# Patient Record
Sex: FEMALE | Race: OTHER | NOT HISPANIC OR LATINO | URBAN - METROPOLITAN AREA
[De-identification: names, ages, dates, MRNs, and addresses within clinical notes are randomized per-mention and may not be internally consistent; named-entity substitution may affect disease eponyms.]

---

## 2017-02-20 ENCOUNTER — EMERGENCY (EMERGENCY)
Facility: HOSPITAL | Age: 77
LOS: 1 days | Discharge: HOME | End: 2017-02-20
Attending: EMERGENCY MEDICINE | Admitting: HOSPITALIST
Payer: MEDICAID

## 2017-02-20 VITALS
SYSTOLIC BLOOD PRESSURE: 132 MMHG | WEIGHT: 166.89 LBS | RESPIRATION RATE: 18 BRPM | OXYGEN SATURATION: 92 % | HEART RATE: 132 BPM | TEMPERATURE: 102 F | DIASTOLIC BLOOD PRESSURE: 76 MMHG

## 2017-02-20 DIAGNOSIS — E78.5 HYPERLIPIDEMIA, UNSPECIFIED: ICD-10-CM

## 2017-02-20 DIAGNOSIS — I10 ESSENTIAL (PRIMARY) HYPERTENSION: ICD-10-CM

## 2017-02-20 DIAGNOSIS — J10.1 INFLUENZA DUE TO OTHER IDENTIFIED INFLUENZA VIRUS WITH OTHER RESPIRATORY MANIFESTATIONS: ICD-10-CM

## 2017-02-20 DIAGNOSIS — H04.559 ACQUIRED STENOSIS OF UNSPECIFIED NASOLACRIMAL DUCT: Chronic | ICD-10-CM

## 2017-02-20 DIAGNOSIS — I82.90 ACUTE EMBOLISM AND THROMBOSIS OF UNSPECIFIED VEIN: ICD-10-CM

## 2017-02-20 DIAGNOSIS — R94.5 ABNORMAL RESULTS OF LIVER FUNCTION STUDIES: ICD-10-CM

## 2017-02-20 DIAGNOSIS — A41.9 SEPSIS, UNSPECIFIED ORGANISM: ICD-10-CM

## 2017-02-20 DIAGNOSIS — E03.9 HYPOTHYROIDISM, UNSPECIFIED: ICD-10-CM

## 2017-02-20 LAB
ALBUMIN SERPL ELPH-MCNC: 4.5 G/DL — SIGNIFICANT CHANGE UP (ref 3.3–5)
ALP SERPL-CCNC: 59 U/L — SIGNIFICANT CHANGE UP (ref 40–120)
ALT FLD-CCNC: 28 U/L — SIGNIFICANT CHANGE UP (ref 4–33)
APPEARANCE UR: CLEAR — SIGNIFICANT CHANGE UP
AST SERPL-CCNC: 34 U/L — HIGH (ref 4–32)
B PERT DNA SPEC QL NAA+PROBE: SIGNIFICANT CHANGE UP
BACTERIA # UR AUTO: SIGNIFICANT CHANGE UP
BASE EXCESS BLDV CALC-SCNC: 0.9 MMOL/L — SIGNIFICANT CHANGE UP
BASOPHILS # BLD AUTO: 0.01 K/UL — SIGNIFICANT CHANGE UP (ref 0–0.2)
BASOPHILS NFR BLD AUTO: 0.1 % — SIGNIFICANT CHANGE UP (ref 0–2)
BASOPHILS NFR SPEC: 0 % — SIGNIFICANT CHANGE UP (ref 0–2)
BILIRUB SERPL-MCNC: 1.7 MG/DL — HIGH (ref 0.2–1.2)
BILIRUB UR-MCNC: NEGATIVE — SIGNIFICANT CHANGE UP
BLOOD GAS VENOUS - CREATININE: 1.27 MG/DL — SIGNIFICANT CHANGE UP (ref 0.5–1.3)
BLOOD UR QL VISUAL: HIGH
BUN SERPL-MCNC: 23 MG/DL — SIGNIFICANT CHANGE UP (ref 7–23)
C PNEUM DNA SPEC QL NAA+PROBE: NOT DETECTED — SIGNIFICANT CHANGE UP
CALCIUM SERPL-MCNC: 9.3 MG/DL — SIGNIFICANT CHANGE UP (ref 8.4–10.5)
CHLORIDE BLDV-SCNC: 103 MMOL/L — SIGNIFICANT CHANGE UP (ref 96–108)
CHLORIDE SERPL-SCNC: 101 MMOL/L — SIGNIFICANT CHANGE UP (ref 98–107)
CO2 SERPL-SCNC: 21 MMOL/L — LOW (ref 22–31)
COLOR SPEC: SIGNIFICANT CHANGE UP
CREAT SERPL-MCNC: 1.24 MG/DL — SIGNIFICANT CHANGE UP (ref 0.5–1.3)
EOSINOPHIL # BLD AUTO: 0.01 K/UL — SIGNIFICANT CHANGE UP (ref 0–0.5)
EOSINOPHIL NFR BLD AUTO: 0.1 % — SIGNIFICANT CHANGE UP (ref 0–6)
EOSINOPHIL NFR FLD: 0 % — SIGNIFICANT CHANGE UP (ref 0–6)
FLUAV H1 2009 PAND RNA SPEC QL NAA+PROBE: POSITIVE — HIGH
FLUAV H1 RNA SPEC QL NAA+PROBE: NOT DETECTED — SIGNIFICANT CHANGE UP
FLUAV H3 RNA SPEC QL NAA+PROBE: NOT DETECTED — SIGNIFICANT CHANGE UP
FLUBV RNA SPEC QL NAA+PROBE: NOT DETECTED — SIGNIFICANT CHANGE UP
GAS PNL BLDV: 140 MMOL/L — SIGNIFICANT CHANGE UP (ref 136–146)
GLUCOSE BLDV-MCNC: 122 — HIGH (ref 70–99)
GLUCOSE SERPL-MCNC: 120 MG/DL — HIGH (ref 70–99)
GLUCOSE UR-MCNC: NEGATIVE — SIGNIFICANT CHANGE UP
HADV DNA SPEC QL NAA+PROBE: NOT DETECTED — SIGNIFICANT CHANGE UP
HCO3 BLDV-SCNC: 25 MMOL/L — SIGNIFICANT CHANGE UP (ref 20–27)
HCOV 229E RNA SPEC QL NAA+PROBE: NOT DETECTED — SIGNIFICANT CHANGE UP
HCOV HKU1 RNA SPEC QL NAA+PROBE: NOT DETECTED — SIGNIFICANT CHANGE UP
HCOV NL63 RNA SPEC QL NAA+PROBE: NOT DETECTED — SIGNIFICANT CHANGE UP
HCOV OC43 RNA SPEC QL NAA+PROBE: NOT DETECTED — SIGNIFICANT CHANGE UP
HCT VFR BLD CALC: 38.3 % — SIGNIFICANT CHANGE UP (ref 34.5–45)
HCT VFR BLDV CALC: 40.4 % — SIGNIFICANT CHANGE UP (ref 34.5–45)
HGB BLD-MCNC: 12.9 G/DL — SIGNIFICANT CHANGE UP (ref 11.5–15.5)
HGB BLDV-MCNC: 13.1 G/DL — SIGNIFICANT CHANGE UP (ref 11.5–15.5)
HMPV RNA SPEC QL NAA+PROBE: NOT DETECTED — SIGNIFICANT CHANGE UP
HPIV1 RNA SPEC QL NAA+PROBE: NOT DETECTED — SIGNIFICANT CHANGE UP
HPIV2 RNA SPEC QL NAA+PROBE: NOT DETECTED — SIGNIFICANT CHANGE UP
HPIV3 RNA SPEC QL NAA+PROBE: NOT DETECTED — SIGNIFICANT CHANGE UP
HPIV4 RNA SPEC QL NAA+PROBE: NOT DETECTED — SIGNIFICANT CHANGE UP
IMM GRANULOCYTES NFR BLD AUTO: 0.4 % — SIGNIFICANT CHANGE UP (ref 0–1.5)
KETONES UR-MCNC: NEGATIVE — SIGNIFICANT CHANGE UP
LACTATE BLDV-MCNC: 1.4 MMOL/L — SIGNIFICANT CHANGE UP (ref 0.5–2)
LEUKOCYTE ESTERASE UR-ACNC: HIGH
LYMPHOCYTES # BLD AUTO: 0.31 K/UL — LOW (ref 1–3.3)
LYMPHOCYTES # BLD AUTO: 4.3 % — LOW (ref 13–44)
LYMPHOCYTES NFR SPEC AUTO: 3 % — LOW (ref 13–44)
M PNEUMO DNA SPEC QL NAA+PROBE: NOT DETECTED — SIGNIFICANT CHANGE UP
MANUAL SMEAR VERIFICATION: SIGNIFICANT CHANGE UP
MCHC RBC-ENTMCNC: 28.5 PG — SIGNIFICANT CHANGE UP (ref 27–34)
MCHC RBC-ENTMCNC: 33.7 % — SIGNIFICANT CHANGE UP (ref 32–36)
MCV RBC AUTO: 84.7 FL — SIGNIFICANT CHANGE UP (ref 80–100)
METAMYELOCYTES # FLD: 1 % — SIGNIFICANT CHANGE UP (ref 0–1)
MONOCYTES # BLD AUTO: 0.22 K/UL — SIGNIFICANT CHANGE UP (ref 0–0.9)
MONOCYTES NFR BLD AUTO: 3.1 % — SIGNIFICANT CHANGE UP (ref 2–14)
MONOCYTES NFR BLD: 3 % — SIGNIFICANT CHANGE UP (ref 2–9)
NEUTROPHIL AB SER-ACNC: 86 % — HIGH (ref 43–77)
NEUTROPHILS # BLD AUTO: 6.62 K/UL — SIGNIFICANT CHANGE UP (ref 1.8–7.4)
NEUTROPHILS NFR BLD AUTO: 92 % — HIGH (ref 43–77)
NEUTS BAND # BLD: 7 % — HIGH (ref 0–6)
NITRITE UR-MCNC: NEGATIVE — SIGNIFICANT CHANGE UP
NON-SQ EPI CELLS # UR AUTO: <1 — SIGNIFICANT CHANGE UP
PCO2 BLDV: 38 MMHG — LOW (ref 41–51)
PH BLDV: 7.43 PH — SIGNIFICANT CHANGE UP (ref 7.32–7.43)
PH UR: 7 — SIGNIFICANT CHANGE UP (ref 4.6–8)
PLATELET # BLD AUTO: 111 K/UL — LOW (ref 150–400)
PLATELET COUNT - ESTIMATE: SIGNIFICANT CHANGE UP
PMV BLD: 10.7 FL — SIGNIFICANT CHANGE UP (ref 7–13)
PO2 BLDV: 36 MMHG — SIGNIFICANT CHANGE UP (ref 35–40)
POTASSIUM BLDV-SCNC: 3.6 MMOL/L — SIGNIFICANT CHANGE UP (ref 3.4–4.5)
POTASSIUM SERPL-MCNC: 3.7 MMOL/L — SIGNIFICANT CHANGE UP (ref 3.5–5.3)
POTASSIUM SERPL-SCNC: 3.7 MMOL/L — SIGNIFICANT CHANGE UP (ref 3.5–5.3)
PROT SERPL-MCNC: 7.4 G/DL — SIGNIFICANT CHANGE UP (ref 6–8.3)
PROT UR-MCNC: 20 — SIGNIFICANT CHANGE UP
RBC # BLD: 4.52 M/UL — SIGNIFICANT CHANGE UP (ref 3.8–5.2)
RBC # FLD: 13.9 % — SIGNIFICANT CHANGE UP (ref 10.3–14.5)
RBC CASTS # UR COMP ASSIST: SIGNIFICANT CHANGE UP (ref 0–?)
RSV RNA SPEC QL NAA+PROBE: NOT DETECTED — SIGNIFICANT CHANGE UP
RV+EV RNA SPEC QL NAA+PROBE: NOT DETECTED — SIGNIFICANT CHANGE UP
SAO2 % BLDV: 67.7 % — SIGNIFICANT CHANGE UP (ref 60–85)
SODIUM SERPL-SCNC: 142 MMOL/L — SIGNIFICANT CHANGE UP (ref 135–145)
SP GR SPEC: 1.01 — SIGNIFICANT CHANGE UP (ref 1–1.03)
SQUAMOUS # UR AUTO: SIGNIFICANT CHANGE UP
UROBILINOGEN FLD QL: NORMAL E.U. — SIGNIFICANT CHANGE UP (ref 0.1–0.2)
WBC # BLD: 7.2 K/UL — SIGNIFICANT CHANGE UP (ref 3.8–10.5)
WBC # FLD AUTO: 7.2 K/UL — SIGNIFICANT CHANGE UP (ref 3.8–10.5)
WBC UR QL: HIGH (ref 0–?)

## 2017-02-20 PROCEDURE — 99285 EMERGENCY DEPT VISIT HI MDM: CPT

## 2017-02-20 RX ORDER — CEFTRIAXONE 500 MG/1
1 INJECTION, POWDER, FOR SOLUTION INTRAMUSCULAR; INTRAVENOUS ONCE
Qty: 0 | Refills: 0 | Status: COMPLETED | OUTPATIENT
Start: 2017-02-20 | End: 2017-02-20

## 2017-02-20 RX ORDER — HEPARIN SODIUM 5000 [USP'U]/ML
5000 INJECTION INTRAVENOUS; SUBCUTANEOUS EVERY 12 HOURS
Qty: 0 | Refills: 0 | Status: DISCONTINUED | OUTPATIENT
Start: 2017-02-20 | End: 2017-02-21

## 2017-02-20 RX ORDER — SIMVASTATIN 20 MG/1
10 TABLET, FILM COATED ORAL AT BEDTIME
Qty: 0 | Refills: 0 | Status: DISCONTINUED | OUTPATIENT
Start: 2017-02-20 | End: 2017-02-21

## 2017-02-20 RX ORDER — ONDANSETRON 8 MG/1
4 TABLET, FILM COATED ORAL EVERY 6 HOURS
Qty: 0 | Refills: 0 | Status: DISCONTINUED | OUTPATIENT
Start: 2017-02-20 | End: 2017-02-21

## 2017-02-20 RX ORDER — LEVOTHYROXINE SODIUM 125 MCG
25 TABLET ORAL DAILY
Qty: 0 | Refills: 0 | Status: DISCONTINUED | OUTPATIENT
Start: 2017-02-20 | End: 2017-02-21

## 2017-02-20 RX ORDER — AZITHROMYCIN 500 MG/1
500 TABLET, FILM COATED ORAL ONCE
Qty: 0 | Refills: 0 | Status: COMPLETED | OUTPATIENT
Start: 2017-02-20 | End: 2017-02-20

## 2017-02-20 RX ORDER — SODIUM CHLORIDE 9 MG/ML
1000 INJECTION INTRAMUSCULAR; INTRAVENOUS; SUBCUTANEOUS ONCE
Qty: 0 | Refills: 0 | Status: COMPLETED | OUTPATIENT
Start: 2017-02-20 | End: 2017-02-20

## 2017-02-20 RX ORDER — ACETAMINOPHEN 500 MG
1000 TABLET ORAL ONCE
Qty: 0 | Refills: 0 | Status: COMPLETED | OUTPATIENT
Start: 2017-02-20 | End: 2017-02-20

## 2017-02-20 RX ORDER — SODIUM CHLORIDE 9 MG/ML
1000 INJECTION INTRAMUSCULAR; INTRAVENOUS; SUBCUTANEOUS
Qty: 0 | Refills: 0 | Status: DISCONTINUED | OUTPATIENT
Start: 2017-02-20 | End: 2017-02-21

## 2017-02-20 RX ORDER — ACETAMINOPHEN 500 MG
650 TABLET ORAL EVERY 6 HOURS
Qty: 0 | Refills: 0 | Status: DISCONTINUED | OUTPATIENT
Start: 2017-02-20 | End: 2017-02-21

## 2017-02-20 RX ADMIN — Medication 30 MILLIGRAM(S): at 13:52

## 2017-02-20 RX ADMIN — Medication 400 MILLIGRAM(S): at 12:07

## 2017-02-20 RX ADMIN — Medication 650 MILLIGRAM(S): at 19:34

## 2017-02-20 RX ADMIN — HEPARIN SODIUM 5000 UNIT(S): 5000 INJECTION INTRAVENOUS; SUBCUTANEOUS at 19:20

## 2017-02-20 RX ADMIN — SIMVASTATIN 10 MILLIGRAM(S): 20 TABLET, FILM COATED ORAL at 21:32

## 2017-02-20 RX ADMIN — ONDANSETRON 4 MILLIGRAM(S): 8 TABLET, FILM COATED ORAL at 19:20

## 2017-02-20 RX ADMIN — SODIUM CHLORIDE 1000 MILLILITER(S): 9 INJECTION INTRAMUSCULAR; INTRAVENOUS; SUBCUTANEOUS at 12:07

## 2017-02-20 RX ADMIN — SODIUM CHLORIDE 75 MILLILITER(S): 9 INJECTION INTRAMUSCULAR; INTRAVENOUS; SUBCUTANEOUS at 19:22

## 2017-02-20 RX ADMIN — CEFTRIAXONE 100 GRAM(S): 500 INJECTION, POWDER, FOR SOLUTION INTRAMUSCULAR; INTRAVENOUS at 13:52

## 2017-02-20 RX ADMIN — AZITHROMYCIN 250 MILLIGRAM(S): 500 TABLET, FILM COATED ORAL at 14:42

## 2017-02-20 NOTE — H&P ADULT. - ATTENDING COMMENTS
Patient seen and examined, plan of care d/w Dr. Oakes.  76 yo F with HTN, HLD, hypothyroidism presenting from home with chills since yesterday ~7pm and lethargy and confusion in setting of high fevers this am brought in by family for this dx in ED w/ influenza A.   #Influenza A a/ mild hypoxia: c/w tamiflu x 5 day as sx <24H (30 BID, renal dosing GFR <60), hold further abx as no LA, although meeting SIRS w/ bandemia patient nontoxic appearing low suspicion for bacterial infection at this time, CXR appears clear on my read, can repeat PA&L in am if worsening resp status to further clarify; IVF as still w/ nausea and unable to tolerate PO H20  #Encephalopathy/delirium: likely in setting of high fevers, nonfocal exam, now at baseline per family, continue to monitor  #CKD 3: monitor Cr, renally dose meds  #HTN: hold home BP meds for now  # EKG: appears to be sinus, no st changes, trop neg x 1, no CP, can recheck now that HR slowed Patient seen and examined, plan of care d/w Dr. Oakes.  Offered  via [place]  but defers to family at bedside.  76 yo F with HTN, HLD, hypothyroidism presenting from home with chills since yesterday ~7pm and lethargy and confusion in setting of high fevers this am brought in by family for this dx in ED w/ influenza A.   #Influenza A a/ mild hypoxia: c/w tamiflu x 5 day as sx <24H (30 BID, renal dosing GFR <60), hold further abx as no LA, although meeting SIRS w/ bandemia patient nontoxic appearing low suspicion for bacterial infection at this time, CXR appears clear on my read, can repeat PA&L in am if worsening resp status to further clarify; IVF as still w/ nausea and unable to tolerate PO H20  #Encephalopathy/delirium: likely in setting of high fevers, nonfocal exam, now at baseline per family, continue to monitor  #CKD 3: monitor Cr, renally dose meds  #HTN: hold home BP meds for now  # EKG: appears to be sinus, no st changes, trop neg x 1, no CP, can recheck now that HR slowed

## 2017-02-20 NOTE — H&P ADULT. - EKG AND INTERPRETATION
Personally reviewed by me. EKG showed tachycardia, with p waves, but some variation of junctional rhythm. No ST changes.

## 2017-02-20 NOTE — ED PROVIDER NOTE - OBJECTIVE STATEMENT
77F PMH HTN, HLD, hypothyroid p/w fever, lethargy from home. Pt noted to be febrile to 102 yesterday with nonproductive cough, improved with Tylenol. This morning, pt was febrile to 104F, continued to cough and could not tolerate PO Tylenol (vomited). Pt previously at baseline, ambulatory without difficulty, alert and oriented, attending to all her own ADLs. +multiple sick contacts with flu/URI symptoms. No abdominal pain, dysuria, hematuria, diarrhea. Family also notes pt more lethargic and fatigued today compared to baseline.    Free translation services offered and refused, translation by May, family friend at bedside. 77F PMH HTN, HLD, hypothyroid p/w fever, lethargy from home. Pt noted to be febrile to 102 yesterday with nonproductive cough, improved with Tylenol. This morning, pt was febrile to 104F, continued to cough and could not tolerate PO Tylenol (vomited). Pt previously at baseline, ambulatory without difficulty, alert and oriented, attending to all her own ADLs. +multiple sick contacts with flu/URI symptoms. No abdominal pain, dysuria, hematuria, diarrhea. Family also notes pt more lethargic and fatigued today compared to baseline.    Free translation services offered and refused, translation by May, family friend at bedside.    Attendinyo female presents with cough and congestion since yesterday.  Also had fever and decreased level of consciousness.  Vomited x 1 today.

## 2017-02-20 NOTE — H&P ADULT. - PROBLEM SELECTOR PLAN 2
- Continuing tamiflu for 5 days.  - Will continue supportive measures with robitussin and tessalon perles.   - Droplet precautions. - Continuing tamiflu 75 mg BID for 5 days.  - Will continue supportive measures with robitussin and tessalon perles.   - Droplet precautions.

## 2017-02-20 NOTE — H&P ADULT. - HISTORY OF PRESENT ILLNESS
76 yo F with HLD/HTN, Hypothyroidism, presented with one day history of fevers and cough.  Patient was doing well up until last night 7 pm. She was at a barbeque and was dancing.  At night she started to feel unwell. Her niece checked her temperature it was 102. She gave the patient tylenol 1000mg and nyquil.  Patient slept well overnight. 8am the next day patient spiked another fever of 104. Patient was given more tylenol, but she was unable to tolerate PO intake at the moment She did not have nausea or vomiting, just felt she couldn't swallow the pills.  Patient had a pneumonia which was bacterial two years ago. That required a 4 day hospital stay.  Patient has multiple sick contacts at home.  No recent travel.      In the ED, pt was febrile to 102.7, came down to 98.7 after tylenol.  Tachycardic, with EKG showing junctional tachy to 130s. Saturating well while awake, but when she naps her sats go down to 92% on 3L NC.  Her RVP came back positive for influenza.  She received a dose of azithromycin and ceftriaxone, as well as oral 30mg tamiflu.

## 2017-02-20 NOTE — ED PROVIDER NOTE - PROGRESS NOTE DETAILS
Pt accepted for admission to hospitalist (Dr. Farmer). MAR textpage sent. pt and family aware and amenable to plan.

## 2017-02-20 NOTE — H&P ADULT. - PROBLEM SELECTOR PLAN 3
- Likely 2/2 to influenza. Will check again tomorrow morning for trend. Currently patient denies abdominal pain.

## 2017-02-20 NOTE — ED ADULT NURSE NOTE - OBJECTIVE STATEMENT
pt with a c/o lethargy and fever. pt with a pmhx of HTN HLD and Hypothyroidism . pt denies sob or chest pain. Iv placed and labs obtained.

## 2017-02-20 NOTE — H&P ADULT. - PROBLEM SELECTOR PLAN 1
Patient has tachycardia, fevers, and bandemia, meeting SIRS criteria, and source is respiratory viral infection 2/2 influenza, presenting as sepsis.   - Patient received antibiotics in the ED, but will not continue as this does not seem like a bacterial source, nor does it represent a bacterial superimposed infection.   - Will hold her Amlodipine for now, due to soft BPs  - Will give fluids as needed. Received 1L in the ER, so will hold off on further fluids for now.

## 2017-02-20 NOTE — H&P ADULT. - LAB RESULTS AND INTERPRETATION
Personally reviewed by me. Patient does not have leukocytosis, but does have bandemia of 7%, with slight elevation of bilirubin and AST.  Normal lactate. Positive for influenza A.

## 2017-02-20 NOTE — PATIENT PROFILE ADULT. - INTERPRETATION SERVICES DECLINED
Patient/Caregiver requests family/friend to interpret. Patient/Caregiver requests family/friend to interpret./called  service to make sure to family as ainterpreter  [id number]

## 2017-02-20 NOTE — ED PROVIDER NOTE - MEDICAL DECISION MAKING DETAILS
77F with sepsis, suspect viral source. Will obtain cbc, cmp, ua, urine cx, blood cx, ekg, cxr, rvp, given tylenol, IVF

## 2017-02-20 NOTE — ED ADULT NURSE NOTE - CHIEF COMPLAINT QUOTE
Pt presents with change in LOC. Pt lethargic, with fever of 104.3F this am taken by family. Pt has been coughing since 4pm yesterday with nausea/vomiting.  Hx HLD, Hypothyroidism, HTN. TP=[id number]

## 2017-02-21 ENCOUNTER — TRANSCRIPTION ENCOUNTER (OUTPATIENT)
Age: 77
End: 2017-02-21

## 2017-02-21 VITALS
HEART RATE: 88 BPM | SYSTOLIC BLOOD PRESSURE: 127 MMHG | TEMPERATURE: 98 F | RESPIRATION RATE: 16 BRPM | DIASTOLIC BLOOD PRESSURE: 72 MMHG | OXYGEN SATURATION: 96 %

## 2017-02-21 LAB
ALBUMIN SERPL ELPH-MCNC: 3.7 G/DL — SIGNIFICANT CHANGE UP (ref 3.3–5)
ALP SERPL-CCNC: 53 U/L — SIGNIFICANT CHANGE UP (ref 40–120)
ALT FLD-CCNC: 29 U/L — SIGNIFICANT CHANGE UP (ref 4–33)
AST SERPL-CCNC: 40 U/L — HIGH (ref 4–32)
BASOPHILS # BLD AUTO: 0.02 K/UL — SIGNIFICANT CHANGE UP (ref 0–0.2)
BASOPHILS NFR BLD AUTO: 0.3 % — SIGNIFICANT CHANGE UP (ref 0–2)
BILIRUB SERPL-MCNC: 1 MG/DL — SIGNIFICANT CHANGE UP (ref 0.2–1.2)
BUN SERPL-MCNC: 22 MG/DL — SIGNIFICANT CHANGE UP (ref 7–23)
CALCIUM SERPL-MCNC: 8.2 MG/DL — LOW (ref 8.4–10.5)
CHLORIDE SERPL-SCNC: 110 MMOL/L — HIGH (ref 98–107)
CO2 SERPL-SCNC: 20 MMOL/L — LOW (ref 22–31)
CREAT SERPL-MCNC: 0.9 MG/DL — SIGNIFICANT CHANGE UP (ref 0.5–1.3)
EOSINOPHIL # BLD AUTO: 0.15 K/UL — SIGNIFICANT CHANGE UP (ref 0–0.5)
EOSINOPHIL NFR BLD AUTO: 2.1 % — SIGNIFICANT CHANGE UP (ref 0–6)
GLUCOSE SERPL-MCNC: 102 MG/DL — HIGH (ref 70–99)
HCT VFR BLD CALC: 33.1 % — LOW (ref 34.5–45)
HGB BLD-MCNC: 10.8 G/DL — LOW (ref 11.5–15.5)
IMM GRANULOCYTES NFR BLD AUTO: 0.3 % — SIGNIFICANT CHANGE UP (ref 0–1.5)
LYMPHOCYTES # BLD AUTO: 0.68 K/UL — LOW (ref 1–3.3)
LYMPHOCYTES # BLD AUTO: 9.5 % — LOW (ref 13–44)
MAGNESIUM SERPL-MCNC: 2.1 MG/DL — SIGNIFICANT CHANGE UP (ref 1.6–2.6)
MCHC RBC-ENTMCNC: 28.1 PG — SIGNIFICANT CHANGE UP (ref 27–34)
MCHC RBC-ENTMCNC: 32.6 % — SIGNIFICANT CHANGE UP (ref 32–36)
MCV RBC AUTO: 86 FL — SIGNIFICANT CHANGE UP (ref 80–100)
MONOCYTES # BLD AUTO: 0.32 K/UL — SIGNIFICANT CHANGE UP (ref 0–0.9)
MONOCYTES NFR BLD AUTO: 4.5 % — SIGNIFICANT CHANGE UP (ref 2–14)
NEUTROPHILS # BLD AUTO: 5.97 K/UL — SIGNIFICANT CHANGE UP (ref 1.8–7.4)
NEUTROPHILS NFR BLD AUTO: 83.3 % — HIGH (ref 43–77)
PHOSPHATE SERPL-MCNC: 2.5 MG/DL — SIGNIFICANT CHANGE UP (ref 2.5–4.5)
PLATELET # BLD AUTO: 111 K/UL — LOW (ref 150–400)
PMV BLD: 11 FL — SIGNIFICANT CHANGE UP (ref 7–13)
POTASSIUM SERPL-MCNC: 4 MMOL/L — SIGNIFICANT CHANGE UP (ref 3.5–5.3)
POTASSIUM SERPL-SCNC: 4 MMOL/L — SIGNIFICANT CHANGE UP (ref 3.5–5.3)
PROT SERPL-MCNC: 5.8 G/DL — LOW (ref 6–8.3)
RBC # BLD: 3.85 M/UL — SIGNIFICANT CHANGE UP (ref 3.8–5.2)
RBC # FLD: 14.6 % — HIGH (ref 10.3–14.5)
SODIUM SERPL-SCNC: 144 MMOL/L — SIGNIFICANT CHANGE UP (ref 135–145)
SPECIMEN SOURCE: SIGNIFICANT CHANGE UP
SPECIMEN SOURCE: SIGNIFICANT CHANGE UP
WBC # BLD: 7.16 K/UL — SIGNIFICANT CHANGE UP (ref 3.8–10.5)
WBC # FLD AUTO: 7.16 K/UL — SIGNIFICANT CHANGE UP (ref 3.8–10.5)

## 2017-02-21 RX ORDER — LEVOTHYROXINE SODIUM 125 MCG
1 TABLET ORAL
Qty: 0 | Refills: 0 | COMMUNITY
Start: 2017-02-21

## 2017-02-21 RX ORDER — ACETAMINOPHEN 500 MG
2 TABLET ORAL
Qty: 30 | Refills: 0 | OUTPATIENT
Start: 2017-02-21

## 2017-02-21 RX ADMIN — Medication 30 MILLIGRAM(S): at 15:52

## 2017-02-21 RX ADMIN — Medication 30 MILLIGRAM(S): at 02:23

## 2017-02-21 RX ADMIN — Medication 650 MILLIGRAM(S): at 02:26

## 2017-02-21 RX ADMIN — HEPARIN SODIUM 5000 UNIT(S): 5000 INJECTION INTRAVENOUS; SUBCUTANEOUS at 07:13

## 2017-02-21 RX ADMIN — Medication 25 MICROGRAM(S): at 07:13

## 2017-02-21 NOTE — DISCHARGE NOTE ADULT - MEDICATION SUMMARY - MEDICATIONS TO TAKE
I will START or STAY ON the medications listed below when I get home from the hospital:    aspirin 81 mg oral tablet  -- 1 tab(s) by mouth once a day  -- Indication: For Health Maintenance    acetaminophen 325 mg oral tablet  -- 2 tab(s) by mouth every 6 hours, As needed, For Temp greater than 38 C (100.4 F)  -- Indication: For Fevers    simvastatin 10 mg oral tablet  -- 1 tab(s) by mouth once a day (at bedtime)  -- Indication: For HLD    oseltamivir 30 mg oral capsule  -- 1 cap(s) by mouth 2 times a day  -- Indication: For Influenza A with respiratory manifestations    alendronate 70 mg oral tablet  -- 1 tab(s) by mouth once a week on Tuesday  -- Indication: For Osteoporosis    amLODIPine 2.5 mg oral tablet  -- 1 tab(s) by mouth once a day  -- Indication: For Essential hypertension    Singulair 10 mg oral tablet  -- 1 tab(s) by mouth once a day  -- Indication: For Allergies    levothyroxine 25 mcg (0.025 mg) oral tablet  -- 1 tab(s) by mouth once a day  -- Indication: For Hypothyroidism, unspecified type    Calcium 500+D  -- 1 tab(s) by mouth 2 times a day  -- Indication: For Health

## 2017-02-21 NOTE — DISCHARGE NOTE ADULT - SECONDARY DIAGNOSIS.
Essential hypertension Hyperlipidemia, unspecified hyperlipidemia type Hypothyroidism, unspecified type

## 2017-02-21 NOTE — DISCHARGE NOTE ADULT - PATIENT PORTAL LINK FT
“You can access the [facility] Patient Portal, offered by [facility], by registering with the following website: [email or web address]”

## 2017-02-21 NOTE — DISCHARGE NOTE ADULT - HOSPITAL COURSE
76 yo F with HLD/HTN, Hypothyroidism, presented with one day history of fevers and cough.  Patient was doing well up until last night 7 pm. She was at a barbeque and was dancing.  At night she started to feel unwell. Her niece checked her temperature it was 102. She gave the patient tylenol 1000mg and nyquil.  Patient slept well overnight. 8am the next day patient spiked another fever of 104. Patient was given more tylenol, but she was unable to tolerate PO intake at the moment She did not have nausea or vomiting, just felt she couldn't swallow the pills.  Patient had a pneumonia which was bacterial two years ago. That required a 4 day hospital stay.  Patient has multiple sick contacts at home.  No recent travel.      In the ED, pt was febrile to 102.7, came down to 98.7 after tylenol.  Tachycardic, with EKG showing junctional tachy to 130s. Saturating well while awake, but when she naps her sats go down to 92% on 3L NC.  Her RVP came back positive for influenza.  She received a dose of azithromycin and ceftriaxone, as well as oral 30mg tamiflu.      76 yo F with hypothyroid, HTN/HLD, presented with 1 day of fevers and dry non productive cough. Found to be dehydrated, and positive for influenza A. Unable to tolerate PO intake. Was admitted for IV hydration, control of nausea and was started on Oseltamivir 30 mg BID (due to renal dosing).  Patient improved by the next day. No longer having high fevers. Patient tolerated PO intake. Patient was also ambulating on room air without any hypoxia.  Patient was safe for discharge home, with family at bedside.

## 2017-02-21 NOTE — DISCHARGE NOTE ADULT - PLAN OF CARE
Clearance of Viral Infection Please continue to take the medication called TAMIFLU, sent to your pharmacy for 4 more days.  You will take one medication two times a day.  A total of 8 pills will be filled for you at your pharmacy.  If you received a morning dose today, you will be due for a night time dose tonight (the day of discharge).    Continue to use nyquil, and tylenol for symptomatic relief.  DO NOT take more than 4,000 mg of Tylenol in one day. Goal BP to be <130/90 Continue to take your blood pressure medications at home as prescribed by your doctor. Controlling Lipids Continue your simvastatin as prescribed by your doctor.

## 2017-02-25 LAB
BACTERIA BLD CULT: SIGNIFICANT CHANGE UP
BACTERIA BLD CULT: SIGNIFICANT CHANGE UP

## 2017-03-12 ENCOUNTER — INPATIENT (INPATIENT)
Facility: HOSPITAL | Age: 77
LOS: 4 days | Discharge: HOME | End: 2017-03-17
Attending: HOSPITALIST | Admitting: HOSPITALIST
Payer: MEDICAID

## 2017-03-12 VITALS
SYSTOLIC BLOOD PRESSURE: 160 MMHG | HEART RATE: 120 BPM | RESPIRATION RATE: 18 BRPM | OXYGEN SATURATION: 98 % | DIASTOLIC BLOOD PRESSURE: 75 MMHG | TEMPERATURE: 98 F

## 2017-03-12 DIAGNOSIS — E78.5 HYPERLIPIDEMIA, UNSPECIFIED: ICD-10-CM

## 2017-03-12 DIAGNOSIS — I10 ESSENTIAL (PRIMARY) HYPERTENSION: ICD-10-CM

## 2017-03-12 DIAGNOSIS — R94.31 ABNORMAL ELECTROCARDIOGRAM [ECG] [EKG]: ICD-10-CM

## 2017-03-12 DIAGNOSIS — Z29.9 ENCOUNTER FOR PROPHYLACTIC MEASURES, UNSPECIFIED: ICD-10-CM

## 2017-03-12 DIAGNOSIS — N17.9 ACUTE KIDNEY FAILURE, UNSPECIFIED: ICD-10-CM

## 2017-03-12 DIAGNOSIS — H04.559 ACQUIRED STENOSIS OF UNSPECIFIED NASOLACRIMAL DUCT: Chronic | ICD-10-CM

## 2017-03-12 DIAGNOSIS — A41.9 SEPSIS, UNSPECIFIED ORGANISM: ICD-10-CM

## 2017-03-12 DIAGNOSIS — D72.825 BANDEMIA: ICD-10-CM

## 2017-03-12 DIAGNOSIS — E03.9 HYPOTHYROIDISM, UNSPECIFIED: ICD-10-CM

## 2017-03-12 LAB
ALBUMIN SERPL ELPH-MCNC: 4.5 G/DL — SIGNIFICANT CHANGE UP (ref 3.3–5)
ALP SERPL-CCNC: 60 U/L — SIGNIFICANT CHANGE UP (ref 40–120)
ALT FLD-CCNC: 24 U/L — SIGNIFICANT CHANGE UP (ref 4–33)
APPEARANCE UR: CLEAR — SIGNIFICANT CHANGE UP
AST SERPL-CCNC: 31 U/L — SIGNIFICANT CHANGE UP (ref 4–32)
B PERT DNA SPEC QL NAA+PROBE: SIGNIFICANT CHANGE UP
BACTERIA # UR AUTO: SIGNIFICANT CHANGE UP
BASE EXCESS BLDV CALC-SCNC: 3.3 MMOL/L — SIGNIFICANT CHANGE UP
BASOPHILS # BLD AUTO: 0 K/UL — SIGNIFICANT CHANGE UP (ref 0–0.2)
BASOPHILS NFR BLD AUTO: 0 % — SIGNIFICANT CHANGE UP (ref 0–2)
BASOPHILS NFR SPEC: 0 % — SIGNIFICANT CHANGE UP (ref 0–2)
BILIRUB SERPL-MCNC: 1.6 MG/DL — HIGH (ref 0.2–1.2)
BILIRUB UR-MCNC: NEGATIVE — SIGNIFICANT CHANGE UP
BLOOD GAS VENOUS - CREATININE: 1.28 MG/DL — SIGNIFICANT CHANGE UP (ref 0.5–1.3)
BLOOD UR QL VISUAL: HIGH
BUN SERPL-MCNC: 21 MG/DL — SIGNIFICANT CHANGE UP (ref 7–23)
C PNEUM DNA SPEC QL NAA+PROBE: NOT DETECTED — SIGNIFICANT CHANGE UP
CALCIUM SERPL-MCNC: 9.1 MG/DL — SIGNIFICANT CHANGE UP (ref 8.4–10.5)
CHLORIDE BLDV-SCNC: 104 MMOL/L — SIGNIFICANT CHANGE UP (ref 96–108)
CHLORIDE SERPL-SCNC: 101 MMOL/L — SIGNIFICANT CHANGE UP (ref 98–107)
CO2 SERPL-SCNC: 23 MMOL/L — SIGNIFICANT CHANGE UP (ref 22–31)
COLOR SPEC: YELLOW — SIGNIFICANT CHANGE UP
CREAT SERPL-MCNC: 1.31 MG/DL — HIGH (ref 0.5–1.3)
EOSINOPHIL # BLD AUTO: 0.02 K/UL — SIGNIFICANT CHANGE UP (ref 0–0.5)
EOSINOPHIL NFR BLD AUTO: 0.3 % — SIGNIFICANT CHANGE UP (ref 0–6)
EOSINOPHIL NFR FLD: 0 % — SIGNIFICANT CHANGE UP (ref 0–6)
FLUAV H1 2009 PAND RNA SPEC QL NAA+PROBE: NOT DETECTED — SIGNIFICANT CHANGE UP
FLUAV H1 RNA SPEC QL NAA+PROBE: NOT DETECTED — SIGNIFICANT CHANGE UP
FLUAV H3 RNA SPEC QL NAA+PROBE: NOT DETECTED — SIGNIFICANT CHANGE UP
FLUAV SUBTYP SPEC NAA+PROBE: SIGNIFICANT CHANGE UP
FLUBV RNA SPEC QL NAA+PROBE: NOT DETECTED — SIGNIFICANT CHANGE UP
GAS PNL BLDV: 135 MMOL/L — LOW (ref 136–146)
GLUCOSE BLDV-MCNC: 151 — HIGH (ref 70–99)
GLUCOSE SERPL-MCNC: 150 MG/DL — HIGH (ref 70–99)
GLUCOSE UR-MCNC: NEGATIVE — SIGNIFICANT CHANGE UP
HADV DNA SPEC QL NAA+PROBE: NOT DETECTED — SIGNIFICANT CHANGE UP
HCO3 BLDV-SCNC: 26 MMOL/L — SIGNIFICANT CHANGE UP (ref 20–27)
HCOV 229E RNA SPEC QL NAA+PROBE: NOT DETECTED — SIGNIFICANT CHANGE UP
HCOV HKU1 RNA SPEC QL NAA+PROBE: NOT DETECTED — SIGNIFICANT CHANGE UP
HCOV NL63 RNA SPEC QL NAA+PROBE: NOT DETECTED — SIGNIFICANT CHANGE UP
HCOV OC43 RNA SPEC QL NAA+PROBE: NOT DETECTED — SIGNIFICANT CHANGE UP
HCT VFR BLD CALC: 38.7 % — SIGNIFICANT CHANGE UP (ref 34.5–45)
HCT VFR BLDV CALC: 40.6 % — SIGNIFICANT CHANGE UP (ref 34.5–45)
HGB BLD-MCNC: 12.9 G/DL — SIGNIFICANT CHANGE UP (ref 11.5–15.5)
HGB BLDV-MCNC: 13.2 G/DL — SIGNIFICANT CHANGE UP (ref 11.5–15.5)
HMPV RNA SPEC QL NAA+PROBE: NOT DETECTED — SIGNIFICANT CHANGE UP
HPIV1 RNA SPEC QL NAA+PROBE: NOT DETECTED — SIGNIFICANT CHANGE UP
HPIV2 RNA SPEC QL NAA+PROBE: NOT DETECTED — SIGNIFICANT CHANGE UP
HPIV3 RNA SPEC QL NAA+PROBE: NOT DETECTED — SIGNIFICANT CHANGE UP
HPIV4 RNA SPEC QL NAA+PROBE: NOT DETECTED — SIGNIFICANT CHANGE UP
HYALINE CASTS # UR AUTO: SIGNIFICANT CHANGE UP (ref 0–?)
IMM GRANULOCYTES NFR BLD AUTO: 0.3 % — SIGNIFICANT CHANGE UP (ref 0–1.5)
KETONES UR-MCNC: NEGATIVE — SIGNIFICANT CHANGE UP
LACTATE BLDV-MCNC: 1.7 MMOL/L — SIGNIFICANT CHANGE UP (ref 0.5–2)
LEUKOCYTE ESTERASE UR-ACNC: NEGATIVE — SIGNIFICANT CHANGE UP
LG PLATELETS BLD QL AUTO: SLIGHT — SIGNIFICANT CHANGE UP
LYMPHOCYTES # BLD AUTO: 0.29 K/UL — LOW (ref 1–3.3)
LYMPHOCYTES # BLD AUTO: 3.9 % — LOW (ref 13–44)
LYMPHOCYTES NFR SPEC AUTO: 5 % — LOW (ref 13–44)
M PNEUMO DNA SPEC QL NAA+PROBE: NOT DETECTED — SIGNIFICANT CHANGE UP
MANUAL SMEAR VERIFICATION: SIGNIFICANT CHANGE UP
MCHC RBC-ENTMCNC: 28.2 PG — SIGNIFICANT CHANGE UP (ref 27–34)
MCHC RBC-ENTMCNC: 33.3 % — SIGNIFICANT CHANGE UP (ref 32–36)
MCV RBC AUTO: 84.7 FL — SIGNIFICANT CHANGE UP (ref 80–100)
MONOCYTES # BLD AUTO: 0.1 K/UL — SIGNIFICANT CHANGE UP (ref 0–0.9)
MONOCYTES NFR BLD AUTO: 1.3 % — LOW (ref 2–14)
MONOCYTES NFR BLD: 1 % — LOW (ref 2–9)
MORPHOLOGY BLD-IMP: NORMAL — SIGNIFICANT CHANGE UP
MUCOUS THREADS # UR AUTO: SIGNIFICANT CHANGE UP
NEUTROPHIL AB SER-ACNC: 84 % — HIGH (ref 43–77)
NEUTROPHILS # BLD AUTO: 7.03 K/UL — SIGNIFICANT CHANGE UP (ref 1.8–7.4)
NEUTROPHILS NFR BLD AUTO: 94.2 % — HIGH (ref 43–77)
NEUTS BAND # BLD: 10 % — HIGH (ref 0–6)
NITRITE UR-MCNC: NEGATIVE — SIGNIFICANT CHANGE UP
NON-SQ EPI CELLS # UR AUTO: <1 — SIGNIFICANT CHANGE UP
OVALOCYTES BLD QL SMEAR: SLIGHT — SIGNIFICANT CHANGE UP
PCO2 BLDV: 40 MMHG — LOW (ref 41–51)
PH BLDV: 7.45 PH — HIGH (ref 7.32–7.43)
PH UR: 8 — SIGNIFICANT CHANGE UP (ref 4.6–8)
PLATELET # BLD AUTO: 169 K/UL — SIGNIFICANT CHANGE UP (ref 150–400)
PMV BLD: 10.2 FL — SIGNIFICANT CHANGE UP (ref 7–13)
PO2 BLDV: 28 MMHG — LOW (ref 35–40)
POTASSIUM BLDV-SCNC: 3.6 MMOL/L — SIGNIFICANT CHANGE UP (ref 3.4–4.5)
POTASSIUM SERPL-MCNC: 3.9 MMOL/L — SIGNIFICANT CHANGE UP (ref 3.5–5.3)
POTASSIUM SERPL-SCNC: 3.9 MMOL/L — SIGNIFICANT CHANGE UP (ref 3.5–5.3)
PROT SERPL-MCNC: 8.1 G/DL — SIGNIFICANT CHANGE UP (ref 6–8.3)
PROT UR-MCNC: 100 — HIGH
RBC # BLD: 4.57 M/UL — SIGNIFICANT CHANGE UP (ref 3.8–5.2)
RBC # FLD: 14.4 % — SIGNIFICANT CHANGE UP (ref 10.3–14.5)
RBC CASTS # UR COMP ASSIST: SIGNIFICANT CHANGE UP (ref 0–?)
RSV RNA SPEC QL NAA+PROBE: NOT DETECTED — SIGNIFICANT CHANGE UP
RV+EV RNA SPEC QL NAA+PROBE: NOT DETECTED — SIGNIFICANT CHANGE UP
SAO2 % BLDV: 51.7 % — LOW (ref 60–85)
SODIUM SERPL-SCNC: 143 MMOL/L — SIGNIFICANT CHANGE UP (ref 135–145)
SP GR SPEC: 1 — SIGNIFICANT CHANGE UP (ref 1–1.03)
SQUAMOUS # UR AUTO: SIGNIFICANT CHANGE UP
UROBILINOGEN FLD QL: NORMAL E.U. — SIGNIFICANT CHANGE UP (ref 0.1–0.2)
WBC # BLD: 7.46 K/UL — SIGNIFICANT CHANGE UP (ref 3.8–10.5)
WBC # FLD AUTO: 7.46 K/UL — SIGNIFICANT CHANGE UP (ref 3.8–10.5)
WBC UR QL: SIGNIFICANT CHANGE UP (ref 0–?)

## 2017-03-12 PROCEDURE — 71250 CT THORAX DX C-: CPT | Mod: 26

## 2017-03-12 PROCEDURE — 71020: CPT | Mod: 26

## 2017-03-12 RX ORDER — LEVOTHYROXINE SODIUM 125 MCG
25 TABLET ORAL DAILY
Qty: 0 | Refills: 0 | Status: DISCONTINUED | OUTPATIENT
Start: 2017-03-12 | End: 2017-03-13

## 2017-03-12 RX ORDER — ACETAMINOPHEN 500 MG
1000 TABLET ORAL ONCE
Qty: 0 | Refills: 0 | Status: COMPLETED | OUTPATIENT
Start: 2017-03-12 | End: 2017-03-12

## 2017-03-12 RX ORDER — HEPARIN SODIUM 5000 [USP'U]/ML
5000 INJECTION INTRAVENOUS; SUBCUTANEOUS EVERY 8 HOURS
Qty: 0 | Refills: 0 | Status: DISCONTINUED | OUTPATIENT
Start: 2017-03-12 | End: 2017-03-17

## 2017-03-12 RX ORDER — ASPIRIN/CALCIUM CARB/MAGNESIUM 324 MG
81 TABLET ORAL DAILY
Qty: 0 | Refills: 0 | Status: DISCONTINUED | OUTPATIENT
Start: 2017-03-12 | End: 2017-03-17

## 2017-03-12 RX ORDER — ACETAMINOPHEN 500 MG
650 TABLET ORAL EVERY 6 HOURS
Qty: 0 | Refills: 0 | Status: DISCONTINUED | OUTPATIENT
Start: 2017-03-12 | End: 2017-03-17

## 2017-03-12 RX ORDER — SODIUM CHLORIDE 9 MG/ML
1000 INJECTION INTRAMUSCULAR; INTRAVENOUS; SUBCUTANEOUS
Qty: 0 | Refills: 0 | Status: DISCONTINUED | OUTPATIENT
Start: 2017-03-12 | End: 2017-03-13

## 2017-03-12 RX ORDER — SIMVASTATIN 20 MG/1
10 TABLET, FILM COATED ORAL AT BEDTIME
Qty: 0 | Refills: 0 | Status: DISCONTINUED | OUTPATIENT
Start: 2017-03-12 | End: 2017-03-17

## 2017-03-12 RX ORDER — SODIUM CHLORIDE 9 MG/ML
2000 INJECTION INTRAMUSCULAR; INTRAVENOUS; SUBCUTANEOUS ONCE
Qty: 0 | Refills: 0 | Status: COMPLETED | OUTPATIENT
Start: 2017-03-12 | End: 2017-03-12

## 2017-03-12 RX ORDER — ONDANSETRON 8 MG/1
4 TABLET, FILM COATED ORAL EVERY 6 HOURS
Qty: 0 | Refills: 0 | Status: DISCONTINUED | OUTPATIENT
Start: 2017-03-12 | End: 2017-03-12

## 2017-03-12 RX ORDER — METOCLOPRAMIDE HCL 10 MG
10 TABLET ORAL ONCE
Qty: 0 | Refills: 0 | Status: COMPLETED | OUTPATIENT
Start: 2017-03-12 | End: 2017-03-12

## 2017-03-12 RX ORDER — DIPHENHYDRAMINE HCL 50 MG
25 CAPSULE ORAL ONCE
Qty: 0 | Refills: 0 | Status: COMPLETED | OUTPATIENT
Start: 2017-03-12 | End: 2017-03-12

## 2017-03-12 RX ADMIN — Medication 400 MILLIGRAM(S): at 17:39

## 2017-03-12 RX ADMIN — SODIUM CHLORIDE 75 MILLILITER(S): 9 INJECTION INTRAMUSCULAR; INTRAVENOUS; SUBCUTANEOUS at 23:49

## 2017-03-12 RX ADMIN — SODIUM CHLORIDE 2000 MILLILITER(S): 9 INJECTION INTRAMUSCULAR; INTRAVENOUS; SUBCUTANEOUS at 17:05

## 2017-03-12 RX ADMIN — Medication 25 MILLIGRAM(S): at 22:10

## 2017-03-12 RX ADMIN — ONDANSETRON 4 MILLIGRAM(S): 8 TABLET, FILM COATED ORAL at 22:10

## 2017-03-12 RX ADMIN — Medication 10 MILLIGRAM(S): at 17:39

## 2017-03-12 NOTE — H&P ADULT. - LAB RESULTS AND INTERPRETATION
Labs: CBC with 10% bands. BMP with elevated creatinine. UA with moderate blood (25 rbc) with no infection. Labs: CBC with 10% bands. BMP with elevated creatinine. UA with moderate blood (25 rbc) with no infection. RVP negative. Labs: CBC with stable Hgb, 10% bands. BMP with elevated creatinine. UA with moderate blood (25 rbc) with no infection. RVP negative. Labs: CBC with stable Hgb, 10% bands. BMP with elevated creatinine. UA with moderate blood (25 rbc), 10-25 wbc. RVP negative.

## 2017-03-12 NOTE — H&P ADULT. - PROBLEM SELECTOR PLAN 4
-Will hold amlodipine given pt hypovolemic and reassess in AM Pt with rash post levaquin, improved with benadryl  -levaquin added to list of allergies  -will avoid in the future.

## 2017-03-12 NOTE — H&P ADULT. - HISTORY OF PRESENT ILLNESS
76yo F with a hx of HTN, HLD and hypothyroidism, recent admission for influenza presents to ED with fever (Tmax of 102) and vomiting.         . Pt's grand-daughter states that she was better when she went home from the hospital and then became febrile again yesterday, with a tmax of 102. Today pt was unable to take any tylenol because she had multiple episodes of vomiting whenever she tried to take medications. No recent travel. No abd pain/diarrhea. No CP/SOB. +mild dry cough today. No dysuria. 78yo F with a hx of HTN, HLD and hypothyroidism, recent admission for influenza presents to ED with fever (Tmax of 102) and vomiting. Last night, patient began to have body aches. Around 3am, she felt nauseous and vomited. She has vomited (nbnb) 4 times in the day and has been unable to tolerate anything po. Granddaughter gave her odansetron 4mg which minimally helped. Granddaughter states pt was doing well after her discharge until last night. Pt also reports chills earlier today. Denies chest pain, sob, +chronic cough (dry) since last admission for the flu, denied abdominal pain, diarrhea, constipation, dysuria, melena. Nephew had a runny nose two days ago. No travel history.     ED: levaquin in ED  99.5    103    114/67   18    96%2lo2 76yo F with a hx of HTN, HLD and hypothyroidism, recent admission for influenza presents to ED with fever (Tmax of 102) and vomiting. Last night, patient began to have body aches. Around 3am, she felt nauseous and vomited. She has vomited (nbnb) 4 times in the day and has been unable to tolerate anything po. Granddaughter gave her odansetron 4mg which minimally helped. Granddaughter states pt was doing well after her discharge until last night. Pt also reports chills earlier today. Denies chest pain, sob, +chronic cough (dry) since last admission for the flu, denied abdominal pain, diarrhea, constipation, dysuria, melena. Nephew had a runny nose two days ago. No travel history.     ED: levaquin in ED and 2L NS. Pt and granddaughter noticed a rash on neck (no itchiness, no swelling, no hives)-unclear if old or after levaquin. Given benadryl 25mg IV x 1.   99.5    103    114/67   18    96%2lo2 Pt denied , requesting family to translate.    76yo Bengali speaking F with a hx of HTN, HLD and hypothyroidism, recent admission for influenza presents to ED with fever (Tmax of 102) and vomiting. Last night, patient began to have body aches. Around 3am, she felt nauseous and vomited. She has vomited (nbnb) 4 times in the day and has been unable to tolerate anything po. Granddaughter gave her odansetron 4mg which minimally helped. Granddaughter states pt was doing well after her discharge until last night. Pt also reports chills earlier today. Denies chest pain, sob, +chronic cough (dry) since last admission for the flu, denied URi symptoms abdominal pain, diarrhea, constipation, dysuria, melena. Nephew had a runny nose two days ago. No travel history.     ED: levaquin in ED and 2L NS. Pt and granddaughter noticed a rash on neck (no itchiness, no swelling, no hives)-unclear if old or after levaquin. Given benadryl 25mg IV x 1. ALso had 1 episode of watery diarrhea in ED.   99.5    103    114/67   18    96%2lo2 Pt denied , requesting family to translate.    76yo Pashto speaking F with a hx of HTN, HLD and hypothyroidism, recent admission for influenza presents to ED with fever (Tmax of 102) and vomiting. Last night, patient began to have body aches. Around 3am, she felt nauseous and vomited. She has vomited (nbnb) 4 times in the day and has been unable to tolerate anything po. Granddaughter gave her odansetron 4mg which minimally helped. Granddaughter states pt was doing well after her discharge until last night. Pt also reports chills earlier today. Denies chest pain, sob, +chronic cough (dry) since last admission for the flu, denied URi symptoms abdominal pain, diarrhea, constipation, dysuria, melena. Nephew had a runny nose two days ago, no one else vomiting/nauseous in family. No travel history.     ED: levaquin in ED and 2L NS. Pt and granddaughter noticed a rash on neck (no itchiness, no swelling, no hives)-unclear if old or after levaquin. Given benadryl 25mg IV x 1. ALso had 1 episode of watery diarrhea in ED.   99.5    103    114/67   18    96%2lo2 Pt denied , requesting family to translate.    78yo Ukrainian speaking F with a hx of HTN, HLD and hypothyroidism, recent admission for influenza presents to ED with fever (Tmax of 102) and vomiting. Last night, patient began to have body aches. Around 3am, she felt nauseous and vomited. She has vomited (nbnb) 4 times in the day and has been unable to tolerate anything po. Granddaughter gave her odansetron 4mg which minimally helped. Granddaughter states pt was doing well after her discharge until last night. Pt also reports chills earlier today. Denies chest pain, sob, +chronic cough (dry) since last admission for the flu, denied URi symptoms abdominal pain, diarrhea, constipation, dysuria, melena. Nephew had a runny nose two days ago, no one else vomiting/nauseous in family. No travel history.     ED: levaquin in ED and 2L NS. Pt and granddaughter noticed a rash on neck (no itchiness, no swelling, no hives)-unclear if old or after levaquin. Given benadryl 25mg IV x 1. ALso had 1 episode of watery diarrhea in ED. After, per granddaughter, pt felt acutely weak BP 90/60-diaphoretic and pale. received 1 NS bolus.     99.5    103    114/67   18    96%2lo2 Pt denied , requesting family to translate.    78yo Vietnamese speaking F with a hx of HTN, HLD and hypothyroidism, recent admission for influenza presents to ED with fever (Tmax of 102) and vomiting. Last night, patient began to have body aches. Around 3am, she felt nauseous and vomited. She has vomited (nbnb) 4 times in the day and has been unable to tolerate anything po. Granddaughter gave her odansetron 4mg which minimally helped. Granddaughter states pt was doing well after her discharge until last night. Pt also reports chills earlier today. Denies chest pain, sob, +chronic cough (dry) since last admission for the flu, denied URi symptoms abdominal pain, diarrhea, constipation, dysuria, melena. Nephew had a runny nose two days ago, no one else vomiting/nauseous in family. No travel history.     ED: levaquin in ED and 2L NS. Pt and granddaughter noticed a rash on neck (no itchiness, no swelling, no hives)-unclear if old or after levaquin. Given benadryl 25mg IV x 1. ALso had 1 episode of watery diarrhea in ED. After, per granddaughter, pt felt acutely weak BP 90/60-diaphoretic and pale. received 1 NS bolus. Tmax 99.5   HR 90-100s, 96% 2L 02. Pt denied , requesting family to translate.    78yo Tamazight speaking F with a hx of HTN, HLD and hypothyroidism, recent admission for influenza presents to ED with fever (Tmax of 102) and vomiting. Last night, patient began to have body aches. Around 3am, she felt nauseous and vomited. She has vomited (nbnb) 4 times in the day and has been unable to tolerate anything po. Granddaughter gave her odansetron 4mg which minimally helped. Granddaughter states pt was doing well after her discharge until last night. Pt also reports chills earlier today. Denies chest pain, sob, +chronic cough (dry) since last admission for the flu, denied URi symptoms abdominal pain, diarrhea, constipation, dysuria, melena. Nephew had a runny nose two days ago, no one else vomiting/nauseous in family. No travel history.     ED: levaquin in ED and 2L NS. Pt and granddaughter noticed a rash on neck (no itchiness, no swelling, no hives)-unclear if old or after levaquin. Given benadryl 25mg IV x 1. ALso had 1 episode of watery diarrhea in ED. After, pt felt acutely weak BP 90/60-diaphoretic and pale. received 1 NS bolus. .   Tmax 99.5   HR 90-100s, 96% 2L 02. Pt denied , requesting family to translate.    76yo Irish speaking F with a hx of HTN, HLD and hypothyroidism, recent admission for influenza presents to ED with fever (Tmax of 102) and vomiting. Last night, patient began to have body aches. Around 3am, she felt nauseous and vomited. She has vomited (nbnb) 4 times in the day and has been unable to tolerate anything po. Granddaughter gave her odansetron 4mg which minimally helped. Granddaughter states pt was doing well after her discharge until last night. Pt also reports chills earlier today. Denies chest pain, sob, +chronic cough (dry) since last admission for the flu, denied URi symptoms abdominal pain, diarrhea, constipation, dysuria, melena. Nephew had a runny nose two days ago, no one else vomiting/nauseous in family. No travel history.     ED: levaquin in ED and 2L NS. Pt and granddaughter noticed a rash on neck (no itchiness, no swelling, no hives)-unclear if old or after levaquin. Given benadryl 25mg IV x 1. ALso had 1 episode of watery diarrhea in ED. After, pt felt acutely weak BP 90/60-diaphoretic and pale. received 1 NS bolus. .   Tmax 99.5   HR 90-100s, 96% 2L 02.

## 2017-03-12 NOTE — H&P ADULT. - PROBLEM SELECTOR PLAN 6
-continue with simvastatin -check TSH (in 2014 was  -continue with synthroid -check TSH (in 2014 was 18.29)  -continue with synthroid 25 mcg daily

## 2017-03-12 NOTE — H&P ADULT. - PROBLEM SELECTOR PLAN 7
DVT ppx: hsq -continue with simvastatin -continue with simvastatin  -consider lipid panel since none noted in system (PMD may have recent values)

## 2017-03-12 NOTE — H&P ADULT. - PROBLEM SELECTOR PLAN 1
Pt with tachycardia and bands. Cxray and CT chest clear for pneumonia. UA negative for infection. Source may be viral given +sick contact (nephew) vs. abdominal source (vomiting, diarrhea, tenderness diffusely on palpation)  -f/up blood cultures  -cw maintenance fluids (pt still tachycardic after bolus) NS @ 75 cc/hr  -consider CT abdomen/pelvis to evaluate for abdominal souce of infection. Pt with tachycardia and bands. Cxray and CT chest clear for pneumonia. UA negative for infection. Source may abdominal source (vomiting, diarrhea, tenderness diffusely on palpation)  -f/up blood cultures  -cw maintenance fluids (pt still tachycardic after bolus) NS @ 75 cc/hr  -consider CT abdomen/pelvis to evaluate for abdominal souce of infection. Pt with tachycardia and bands. Cxray and CT chest clear for pneumonia. Source may abdominal source (vomiting, diarrhea, tenderness diffusely on palpation)  -f/up blood cultures  -cw maintenance fluids (pt still tachycardic after bolus) NS @ 75 cc/hr  -consider CT abdomen/pelvis to evaluate for abdominal souce of infection. Pt with tachycardia and bands. CT chest showing ground glass opacities. Source may be pulmonary (pt may have had a superimposed pna after influenca)-will cover for hcap.   -start vancomycin/ zosyn.   -f/up blood cultures  -cw maintenance fluids (pt still tachycardic after bolus) NS @ 75 cc/hr x 24 hours and then reassess   -consider CT abdomen/pelvis to evaluate for abdominal souce of infection given pt has (vomiting, diarrhea, mild tenderness diffusely on palpation). Will give simethicone and PPI for abdominal source. Pt with tachycardia and bands. CT chest showing ground glass opacities. Source may be pulmonary (pt may have had a superimposed pna with/after influenza)-will cover for hcap.  -start vancomycin/ zosyn.   -f/up blood cultures  -cw maintenance fluids (pt still tachycardic after bolus) NS @ 75 cc/hr x 24 hours and then reassess   -consider CT abdomen/pelvis to evaluate for abdominal source of infection given pt has (vomiting, diarrhea, mild tenderness diffusely on palpation). Will give simethicone and PPI for abdominal discomfort for now

## 2017-03-12 NOTE — ED PROVIDER NOTE - PROGRESS NOTE DETAILS
Still nauseated in spite of fluids and reglan. CXR [facility], Of note CBC has WBC 7.8 with 10% bands. Ct chest done, pending official reading- prelim reported as unchanged from 2015. Will start levaquin and admit to medicine hospitalist A team

## 2017-03-12 NOTE — H&P ADULT. - ATTENDING COMMENTS
Seen and evaluated at bedside with family present.  Assessment and plan as detailed by Resident.  Patient still with episodic vomiting and weakness thereafter, as well as watery diarrhea.  Family states that patient was on Azithromycin ~ 10 days ago.  Stool for C-difficile toxin ordered.  Repeat ECG now with tachycardia (114, previous rate = 81 bpm) and QTc at 399 (previously 555).  Prescribed zofran (previously not prescribed because of s/e of QTc prolongation).  Synthroid 25 mg PO changed to 12.5 mg IV daily, Protonix 40 mg PO changed to IV daily.  IVF NS 1 liter bolus prescribed, and to continue on maintenance IVF NS at 100 mL/Hr (increased from 75 mL/Hr).  All other management as prescribed.

## 2017-03-12 NOTE — H&P ADULT. - LYMPHATIC
supraclavicular R/posterior cervical L/supraclavicular L/posterior cervical R/anterior cervical R/anterior cervical L

## 2017-03-12 NOTE — H&P ADULT. - PMH
Essential hypertension    HLD (hyperlipidemia)    Hypothyroidism, unspecified type Essential hypertension    HLD (hyperlipidemia)    Hypothyroidism, unspecified type    Influenza

## 2017-03-12 NOTE — ED ADULT NURSE NOTE - OBJECTIVE STATEMENT
patient alert ox3 speaks Polish, brought in by care taker states" she is been having fever with nausea since yesterday. denies any diarrhea. denies cp/sob. skin warm and dry. not in any distress. labs done as ordered. awaiting results and re eval.

## 2017-03-12 NOTE — ED PROVIDER NOTE - OBJECTIVE STATEMENT
76yo F with a hx of HTN, HLD and hypothyroidism, recent admission for flu A, p/w fever and vomiting. Pt's grand-daughter states that she was better when she went home from the hospital and then became febrile again yesterday, with a tmax of 102. Today pt was unable to take any tylenol because she had multiple episodes of vomiting whenever she tried to take medications. No recent travel. No abd pain/diarrhea. No CP/SOB. +mild dry cough today. No dysuria. 78yo F with a hx of HTN, HLD and hypothyroidism, recent admission for flu A, p/w fever and vomiting. Pt's grand-daughter states that she was better when she went home from the hospital and then became febrile again yesterday, with a tmax of 102. Today pt was unable to take any tylenol because she had multiple episodes of vomiting whenever she tried to take medications. No recent travel. No abd pain/diarrhea. No CP/SOB. +mild dry cough today. No dysuria.  Patient declined  phone and requested translation by family at bedside.

## 2017-03-12 NOTE — H&P ADULT. - PROBLEM SELECTOR PLAN 2
Creatinine 1.3 with baseline 0.9. Likely 2/2 prerenal azotemia given decreased po intake/vomiting.   -cw maintenance fluids and monitor BMP  -if creatinine does not improve, will check urine lytes, urine creatinine Creatinine 1.3 with baseline 0.9. Likely 2/2 prerenal azotemia given decreased po intake/vomiting.   -cw maintenance fluids and monitor BMP  -if creatinine does not improve, will check urine lytes, urine creatinine  -avoid nephrotoxins Creatinine 1.3 with baseline 0.9. Likely 2/2 prerenal azotemia given decreased po intake/vomiting/insensible losses  -cw maintenance fluids as above and monitor BMP  -if creatinine does not improve, will check urine lytes, urine creatinine  -avoid nephrotoxins

## 2017-03-12 NOTE — ED PROVIDER NOTE - ATTENDING CONTRIBUTION TO CARE
78yo F with a hx of HTN, HLD and hypothyroidism, recent admission for flu A, p/w fever and vomiting. Pt's grand-daughter states that she was better when she went home from the hospital and then became febrile again yesterday, with a tmax of 102. Today pt was unable to take any tylenol because she had multiple episodes of vomiting whenever she tried to take medications. No recent travel. No abd pain/diarrhea. No CP/SOB. +mild dry cough today. No dysuria. On exam afebrile currently, in no distress, VSS scattered lower lung field crackles, heart, pulses, abdomen, neuro, extremities, skin, all normal on exam. Plan: IV hydration and antiemetics, labs CXR reassess 76yo F with a hx of HTN, HLD and hypothyroidism, recent admission for flu A, p/w fever and vomiting. Pt's grand-daughter states that she was better when she went home from the hospital and then became febrile again yesterday, with a tmax of 102. Today pt was unable to take any tylenol because she had multiple episodes of vomiting whenever she tried to take medications. No recent travel. No abd pain/diarrhea. No CP/SOB. +mild dry cough today. No dysuria. On exam afebrile currently, in no distress, VSS scattered lower lung field crackles, heart, pulses, abdomen, neuro, extremities, skin, all normal on exam. Plan: IV hydration and antiemetics, labs CXR UA, reassess

## 2017-03-12 NOTE — ED ADULT NURSE REASSESSMENT NOTE - NS ED NURSE REASSESS COMMENT FT1
Pt had episode of vomiting with rash to chest following infusing of Levaquin.  MAR aware.  Pt medicated as ordered by MD.  Will continue to monitor. and reassess.

## 2017-03-12 NOTE — H&P ADULT. - NEGATIVE CARDIOVASCULAR SYMPTOMS
no chest pain/no peripheral edema/no palpitations/no dyspnea on exertion/no orthopnea/no paroxysmal nocturnal dyspnea/no claudication

## 2017-03-12 NOTE — H&P ADULT. - RS GEN PE MLT RESP DETAILS PC
airway patent/respirations non-labored/good air movement/clear to auscultation bilaterally/breath sounds equal/normal

## 2017-03-12 NOTE — H&P ADULT. - PROBLEM SELECTOR PLAN 5
-continue with synthroid -Will hold amlodipine given pt hypovolemic and reassess in AM -no acute issues presently  -Will hold amlodipine given pt hypovolemic and reassess in AM

## 2017-03-12 NOTE — H&P ADULT. - PROBLEM SELECTOR PLAN 3
-Will hold amlodipine given pt dehydrated and reassess in AM . Last EKG in Feb 2017 qtc was 579  -recheck EKG  -check cardiac enzymes  -avoid zofran and other qtc prolonging agents. . Last EKG in Feb 2017 qtc was 579  -recheck EKG  -avoid zofran and other qtc prolonging agents. . Last EKG in Feb 2017 qtc was 579  -recheck EKG  -avoid zofran and other qtc prolonging agents.  -monitor on tele . Last EKG in Feb 2017 qtc was also prolonged: 579  -recheck EKG  -avoid zofran and other qtc prolonging agents.  -monitor on tele . Last EKG in Feb 2017 qtc was also prolonged: 579  -recheck EKG  -avoid zofran and other qtc prolonging agents.  -check cardiac enzymes . Last EKG in Feb 2017 qtc was also prolonged: 579  -recheck EKG  -avoid zofran and other QTc prolonging agents.  -check cardiac enzymes

## 2017-03-12 NOTE — H&P ADULT. - NEGATIVE ENMT SYMPTOMS
no nasal congestion/no ear pain/no nasal obstruction/no nasal discharge/no tinnitus/no vertigo/no hearing difficulty/no sinus symptoms

## 2017-03-12 NOTE — H&P ADULT. - ASSESSMENT
76yo Frisian speaking F with a hx of HTN, HLD and hypothyroidism, recent admission for influenza presents to ED with fever (Tmax of 102) and vomiting, found to have sepsis (tachycardia and 10% bands) likely 2/2 abdominal source of viral. 78yo Frisian speaking F with a hx of HTN, HLD and hypothyroidism, recent admission for influenza presents to ED with fever (Tmax of 102) and vomiting, found to have sepsis (tachycardia and 10% bands) likely 2/2 abdominal source

## 2017-03-12 NOTE — H&P ADULT. - RADIOLOGY RESULTS AND INTERPRETATION
Cxray: clear lungs  CT chest: ground glass opacities Cxray reviewed: clear lungs  CT chest: Patchy groundglass opacities may relate partially to atelectasis and partially due to the expiratory phase of the study.

## 2017-03-12 NOTE — ED ADULT TRIAGE NOTE - CHIEF COMPLAINT QUOTE
p/t c/o of cough cold body aches and fever for few days p/t recently treated for flu c/o of sob as well

## 2017-03-13 DIAGNOSIS — T78.40XA ALLERGY, UNSPECIFIED, INITIAL ENCOUNTER: ICD-10-CM

## 2017-03-13 LAB
ALBUMIN SERPL ELPH-MCNC: 3.3 G/DL — SIGNIFICANT CHANGE UP (ref 3.3–5)
ALBUMIN SERPL ELPH-MCNC: 3.3 G/DL — SIGNIFICANT CHANGE UP (ref 3.3–5)
ALP SERPL-CCNC: 41 U/L — SIGNIFICANT CHANGE UP (ref 40–120)
ALP SERPL-CCNC: 47 U/L — SIGNIFICANT CHANGE UP (ref 40–120)
ALT FLD-CCNC: 25 U/L — SIGNIFICANT CHANGE UP (ref 4–33)
ALT FLD-CCNC: 29 U/L — SIGNIFICANT CHANGE UP (ref 4–33)
APPEARANCE UR: CLEAR — SIGNIFICANT CHANGE UP
AST SERPL-CCNC: 36 U/L — HIGH (ref 4–32)
AST SERPL-CCNC: 39 U/L — HIGH (ref 4–32)
BACTERIA # UR AUTO: SIGNIFICANT CHANGE UP
BASE EXCESS BLDA CALC-SCNC: -4 MMOL/L — SIGNIFICANT CHANGE UP
BASOPHILS # BLD AUTO: 0 K/UL — SIGNIFICANT CHANGE UP (ref 0–0.2)
BASOPHILS # BLD AUTO: 0.01 K/UL — SIGNIFICANT CHANGE UP (ref 0–0.2)
BASOPHILS NFR BLD AUTO: 0 % — SIGNIFICANT CHANGE UP (ref 0–2)
BASOPHILS NFR BLD AUTO: 0.3 % — SIGNIFICANT CHANGE UP (ref 0–2)
BILIRUB DIRECT SERPL-MCNC: 0.5 MG/DL — HIGH (ref 0.1–0.2)
BILIRUB SERPL-MCNC: 1.5 MG/DL — HIGH (ref 0.2–1.2)
BILIRUB SERPL-MCNC: 1.5 MG/DL — HIGH (ref 0.2–1.2)
BILIRUB UR-MCNC: NEGATIVE — SIGNIFICANT CHANGE UP
BLOOD UR QL VISUAL: HIGH
BUN SERPL-MCNC: 21 MG/DL — SIGNIFICANT CHANGE UP (ref 7–23)
BUN SERPL-MCNC: 21 MG/DL — SIGNIFICANT CHANGE UP (ref 7–23)
CALCIUM SERPL-MCNC: 7.8 MG/DL — LOW (ref 8.4–10.5)
CALCIUM SERPL-MCNC: 8 MG/DL — LOW (ref 8.4–10.5)
CHLORIDE SERPL-SCNC: 103 MMOL/L — SIGNIFICANT CHANGE UP (ref 98–107)
CHLORIDE SERPL-SCNC: 106 MMOL/L — SIGNIFICANT CHANGE UP (ref 98–107)
CK MB BLD-MCNC: 1 NG/ML — SIGNIFICANT CHANGE UP (ref 1–4.7)
CK SERPL-CCNC: 77 U/L — SIGNIFICANT CHANGE UP (ref 25–170)
CO2 SERPL-SCNC: 18 MMOL/L — LOW (ref 22–31)
CO2 SERPL-SCNC: 21 MMOL/L — LOW (ref 22–31)
COLOR SPEC: SIGNIFICANT CHANGE UP
CREAT SERPL-MCNC: 1.09 MG/DL — SIGNIFICANT CHANGE UP (ref 0.5–1.3)
CREAT SERPL-MCNC: 1.12 MG/DL — SIGNIFICANT CHANGE UP (ref 0.5–1.3)
EOSINOPHIL # BLD AUTO: 0.03 K/UL — SIGNIFICANT CHANGE UP (ref 0–0.5)
EOSINOPHIL # BLD AUTO: 0.05 K/UL — SIGNIFICANT CHANGE UP (ref 0–0.5)
EOSINOPHIL NFR BLD AUTO: 1.2 % — SIGNIFICANT CHANGE UP (ref 0–6)
EOSINOPHIL NFR BLD AUTO: 1.3 % — SIGNIFICANT CHANGE UP (ref 0–6)
GLUCOSE BLDA-MCNC: 116 MG/DL — HIGH (ref 70–99)
GLUCOSE SERPL-MCNC: 118 MG/DL — HIGH (ref 70–99)
GLUCOSE SERPL-MCNC: 119 MG/DL — HIGH (ref 70–99)
GLUCOSE UR-MCNC: NEGATIVE — SIGNIFICANT CHANGE UP
HCO3 BLDA-SCNC: 22 MMOL/L — SIGNIFICANT CHANGE UP (ref 22–26)
HCT VFR BLD CALC: 35.4 % — SIGNIFICANT CHANGE UP (ref 34.5–45)
HCT VFR BLD CALC: 36.5 % — SIGNIFICANT CHANGE UP (ref 34.5–45)
HCT VFR BLDA CALC: 36.2 % — SIGNIFICANT CHANGE UP (ref 34.5–46.5)
HGB BLD-MCNC: 11.6 G/DL — SIGNIFICANT CHANGE UP (ref 11.5–15.5)
HGB BLD-MCNC: 11.9 G/DL — SIGNIFICANT CHANGE UP (ref 11.5–15.5)
HGB BLDA-MCNC: 11.8 G/DL — SIGNIFICANT CHANGE UP (ref 11.5–15.5)
IMM GRANULOCYTES NFR BLD AUTO: 0 % — SIGNIFICANT CHANGE UP (ref 0–1.5)
IMM GRANULOCYTES NFR BLD AUTO: 0.3 % — SIGNIFICANT CHANGE UP (ref 0–1.5)
KETONES UR-MCNC: NEGATIVE — SIGNIFICANT CHANGE UP
LEUKOCYTE ESTERASE UR-ACNC: NEGATIVE — SIGNIFICANT CHANGE UP
LYMPHOCYTES # BLD AUTO: 0.39 K/UL — LOW (ref 1–3.3)
LYMPHOCYTES # BLD AUTO: 0.45 K/UL — LOW (ref 1–3.3)
LYMPHOCYTES # BLD AUTO: 10 % — LOW (ref 13–44)
LYMPHOCYTES # BLD AUTO: 18.4 % — SIGNIFICANT CHANGE UP (ref 13–44)
MACROCYTES BLD QL: SIGNIFICANT CHANGE UP
MAGNESIUM SERPL-MCNC: 1.8 MG/DL — SIGNIFICANT CHANGE UP (ref 1.6–2.6)
MAGNESIUM SERPL-MCNC: 1.9 MG/DL — SIGNIFICANT CHANGE UP (ref 1.6–2.6)
MANUAL SMEAR VERIFICATION: SIGNIFICANT CHANGE UP
MCHC RBC-ENTMCNC: 27.3 PG — SIGNIFICANT CHANGE UP (ref 27–34)
MCHC RBC-ENTMCNC: 28.6 PG — SIGNIFICANT CHANGE UP (ref 27–34)
MCHC RBC-ENTMCNC: 31.8 % — LOW (ref 32–36)
MCHC RBC-ENTMCNC: 33.6 % — SIGNIFICANT CHANGE UP (ref 32–36)
MCV RBC AUTO: 85.1 FL — SIGNIFICANT CHANGE UP (ref 80–100)
MCV RBC AUTO: 85.9 FL — SIGNIFICANT CHANGE UP (ref 80–100)
MONOCYTES # BLD AUTO: 0.07 K/UL — SIGNIFICANT CHANGE UP (ref 0–0.9)
MONOCYTES # BLD AUTO: 0.11 K/UL — SIGNIFICANT CHANGE UP (ref 0–0.9)
MONOCYTES NFR BLD AUTO: 2.8 % — SIGNIFICANT CHANGE UP (ref 2–14)
MONOCYTES NFR BLD AUTO: 2.9 % — SIGNIFICANT CHANGE UP (ref 2–14)
MUCOUS THREADS # UR AUTO: SIGNIFICANT CHANGE UP
NEUTROPHILS # BLD AUTO: 1.9 K/UL — SIGNIFICANT CHANGE UP (ref 1.8–7.4)
NEUTROPHILS # BLD AUTO: 3.33 K/UL — SIGNIFICANT CHANGE UP (ref 1.8–7.4)
NEUTROPHILS NFR BLD AUTO: 77.5 % — HIGH (ref 43–77)
NEUTROPHILS NFR BLD AUTO: 85.3 % — HIGH (ref 43–77)
NITRITE UR-MCNC: NEGATIVE — SIGNIFICANT CHANGE UP
PCO2 BLDA: 28 MMHG — LOW (ref 32–48)
PH BLDA: 7.45 PH — SIGNIFICANT CHANGE UP (ref 7.35–7.45)
PH UR: 7 — SIGNIFICANT CHANGE UP (ref 4.6–8)
PHOSPHATE SERPL-MCNC: 1.7 MG/DL — LOW (ref 2.5–4.5)
PHOSPHATE SERPL-MCNC: 3.1 MG/DL — SIGNIFICANT CHANGE UP (ref 2.5–4.5)
PLATELET # BLD AUTO: 140 K/UL — LOW (ref 150–400)
PLATELET # BLD AUTO: 158 K/UL — SIGNIFICANT CHANGE UP (ref 150–400)
PMV BLD: 10.1 FL — SIGNIFICANT CHANGE UP (ref 7–13)
PMV BLD: 10.6 FL — SIGNIFICANT CHANGE UP (ref 7–13)
PO2 BLDA: 59 MMHG — LOW (ref 83–108)
POTASSIUM BLDA-SCNC: 3.2 MMOL/L — LOW (ref 3.4–4.5)
POTASSIUM SERPL-MCNC: 3.6 MMOL/L — SIGNIFICANT CHANGE UP (ref 3.5–5.3)
POTASSIUM SERPL-MCNC: 4.1 MMOL/L — SIGNIFICANT CHANGE UP (ref 3.5–5.3)
POTASSIUM SERPL-SCNC: 3.6 MMOL/L — SIGNIFICANT CHANGE UP (ref 3.5–5.3)
POTASSIUM SERPL-SCNC: 4.1 MMOL/L — SIGNIFICANT CHANGE UP (ref 3.5–5.3)
PROT SERPL-MCNC: 6.3 G/DL — SIGNIFICANT CHANGE UP (ref 6–8.3)
PROT SERPL-MCNC: 6.3 G/DL — SIGNIFICANT CHANGE UP (ref 6–8.3)
PROT UR-MCNC: 20 — SIGNIFICANT CHANGE UP
RBC # BLD: 4.16 M/UL — SIGNIFICANT CHANGE UP (ref 3.8–5.2)
RBC # BLD: 4.25 M/UL — SIGNIFICANT CHANGE UP (ref 3.8–5.2)
RBC # FLD: 14.6 % — HIGH (ref 10.3–14.5)
RBC # FLD: 14.7 % — HIGH (ref 10.3–14.5)
RBC CASTS # UR COMP ASSIST: SIGNIFICANT CHANGE UP (ref 0–?)
SAO2 % BLDA: 91.8 % — LOW (ref 95–99)
SODIUM BLDA-SCNC: 137 MMOL/L — SIGNIFICANT CHANGE UP (ref 136–146)
SODIUM SERPL-SCNC: 140 MMOL/L — SIGNIFICANT CHANGE UP (ref 135–145)
SODIUM SERPL-SCNC: 141 MMOL/L — SIGNIFICANT CHANGE UP (ref 135–145)
SP GR SPEC: 1.02 — SIGNIFICANT CHANGE UP (ref 1–1.03)
SPECIMEN SOURCE: SIGNIFICANT CHANGE UP
SPECIMEN SOURCE: SIGNIFICANT CHANGE UP
SQUAMOUS # UR AUTO: SIGNIFICANT CHANGE UP
TROPONIN T SERPL-MCNC: < 0.06 NG/ML — SIGNIFICANT CHANGE UP (ref 0–0.06)
TSH SERPL-MCNC: 3.54 UIU/ML — SIGNIFICANT CHANGE UP (ref 0.27–4.2)
UROBILINOGEN FLD QL: NORMAL E.U. — SIGNIFICANT CHANGE UP (ref 0.1–0.2)
WBC # BLD: 2.45 K/UL — LOW (ref 3.8–10.5)
WBC # BLD: 3.9 K/UL — SIGNIFICANT CHANGE UP (ref 3.8–10.5)
WBC # FLD AUTO: 2.45 K/UL — LOW (ref 3.8–10.5)
WBC # FLD AUTO: 3.9 K/UL — SIGNIFICANT CHANGE UP (ref 3.8–10.5)
WBC UR QL: SIGNIFICANT CHANGE UP (ref 0–?)

## 2017-03-13 PROCEDURE — 99223 1ST HOSP IP/OBS HIGH 75: CPT | Mod: GC

## 2017-03-13 PROCEDURE — 74177 CT ABD & PELVIS W/CONTRAST: CPT | Mod: 26

## 2017-03-13 PROCEDURE — 93010 ELECTROCARDIOGRAM REPORT: CPT

## 2017-03-13 PROCEDURE — 71010: CPT | Mod: 26

## 2017-03-13 PROCEDURE — 99254 IP/OBS CNSLTJ NEW/EST MOD 60: CPT | Mod: GC

## 2017-03-13 RX ORDER — VANCOMYCIN HCL 1 G
VIAL (EA) INTRAVENOUS
Qty: 0 | Refills: 0 | Status: DISCONTINUED | OUTPATIENT
Start: 2017-03-14 | End: 2017-03-15

## 2017-03-13 RX ORDER — AZITHROMYCIN 500 MG/1
TABLET, FILM COATED ORAL
Qty: 0 | Refills: 0 | Status: DISCONTINUED | OUTPATIENT
Start: 2017-03-13 | End: 2017-03-15

## 2017-03-13 RX ORDER — VANCOMYCIN HCL 1 G
VIAL (EA) INTRAVENOUS
Qty: 0 | Refills: 0 | Status: DISCONTINUED | OUTPATIENT
Start: 2017-03-13 | End: 2017-03-13

## 2017-03-13 RX ORDER — LEVOTHYROXINE SODIUM 125 MCG
1 TABLET ORAL
Qty: 0 | Refills: 0 | COMMUNITY

## 2017-03-13 RX ORDER — LEVOTHYROXINE SODIUM 125 MCG
12.5 TABLET ORAL DAILY
Qty: 0 | Refills: 0 | Status: DISCONTINUED | OUTPATIENT
Start: 2017-03-13 | End: 2017-03-14

## 2017-03-13 RX ORDER — SODIUM CHLORIDE 9 MG/ML
1000 INJECTION INTRAMUSCULAR; INTRAVENOUS; SUBCUTANEOUS
Qty: 0 | Refills: 0 | Status: DISCONTINUED | OUTPATIENT
Start: 2017-03-13 | End: 2017-03-13

## 2017-03-13 RX ORDER — SIMETHICONE 80 MG/1
80 TABLET, CHEWABLE ORAL THREE TIMES A DAY
Qty: 0 | Refills: 0 | Status: DISCONTINUED | OUTPATIENT
Start: 2017-03-13 | End: 2017-03-17

## 2017-03-13 RX ORDER — VANCOMYCIN HCL 1 G
1000 VIAL (EA) INTRAVENOUS EVERY 12 HOURS
Qty: 0 | Refills: 0 | Status: DISCONTINUED | OUTPATIENT
Start: 2017-03-13 | End: 2017-03-13

## 2017-03-13 RX ORDER — ONDANSETRON 8 MG/1
4 TABLET, FILM COATED ORAL EVERY 8 HOURS
Qty: 0 | Refills: 0 | Status: DISCONTINUED | OUTPATIENT
Start: 2017-03-13 | End: 2017-03-15

## 2017-03-13 RX ORDER — SODIUM CHLORIDE 9 MG/ML
1000 INJECTION INTRAMUSCULAR; INTRAVENOUS; SUBCUTANEOUS ONCE
Qty: 0 | Refills: 0 | Status: COMPLETED | OUTPATIENT
Start: 2017-03-13 | End: 2017-03-13

## 2017-03-13 RX ORDER — ACETAMINOPHEN 500 MG
650 TABLET ORAL ONCE
Qty: 0 | Refills: 0 | Status: COMPLETED | OUTPATIENT
Start: 2017-03-13 | End: 2017-03-13

## 2017-03-13 RX ORDER — AZITHROMYCIN 500 MG/1
500 TABLET, FILM COATED ORAL EVERY 24 HOURS
Qty: 0 | Refills: 0 | Status: DISCONTINUED | OUTPATIENT
Start: 2017-03-14 | End: 2017-03-15

## 2017-03-13 RX ORDER — PANTOPRAZOLE SODIUM 20 MG/1
40 TABLET, DELAYED RELEASE ORAL DAILY
Qty: 0 | Refills: 0 | Status: DISCONTINUED | OUTPATIENT
Start: 2017-03-13 | End: 2017-03-13

## 2017-03-13 RX ORDER — ALENDRONATE SODIUM 70 MG/1
1 TABLET ORAL
Qty: 0 | Refills: 0 | COMMUNITY

## 2017-03-13 RX ORDER — IPRATROPIUM/ALBUTEROL SULFATE 18-103MCG
3 AEROSOL WITH ADAPTER (GRAM) INHALATION EVERY 6 HOURS
Qty: 0 | Refills: 0 | Status: DISCONTINUED | OUTPATIENT
Start: 2017-03-13 | End: 2017-03-17

## 2017-03-13 RX ORDER — ONDANSETRON 8 MG/1
4 TABLET, FILM COATED ORAL EVERY 8 HOURS
Qty: 0 | Refills: 0 | Status: DISCONTINUED | OUTPATIENT
Start: 2017-03-13 | End: 2017-03-13

## 2017-03-13 RX ORDER — MORPHINE SULFATE 50 MG/1
2 CAPSULE, EXTENDED RELEASE ORAL ONCE
Qty: 0 | Refills: 0 | Status: DISCONTINUED | OUTPATIENT
Start: 2017-03-13 | End: 2017-03-13

## 2017-03-13 RX ORDER — AMLODIPINE BESYLATE 2.5 MG/1
1 TABLET ORAL
Qty: 0 | Refills: 0 | COMMUNITY

## 2017-03-13 RX ORDER — VANCOMYCIN HCL 1 G
1000 VIAL (EA) INTRAVENOUS ONCE
Qty: 0 | Refills: 0 | Status: COMPLETED | OUTPATIENT
Start: 2017-03-13 | End: 2017-03-13

## 2017-03-13 RX ORDER — LEVOTHYROXINE SODIUM 125 MCG
0.5 TABLET ORAL
Qty: 0 | Refills: 0 | COMMUNITY

## 2017-03-13 RX ORDER — SIMVASTATIN 20 MG/1
1 TABLET, FILM COATED ORAL
Qty: 0 | Refills: 0 | COMMUNITY

## 2017-03-13 RX ORDER — SODIUM CHLORIDE 9 MG/ML
1000 INJECTION, SOLUTION INTRAVENOUS
Qty: 0 | Refills: 0 | Status: DISCONTINUED | OUTPATIENT
Start: 2017-03-13 | End: 2017-03-14

## 2017-03-13 RX ORDER — AZITHROMYCIN 500 MG/1
500 TABLET, FILM COATED ORAL ONCE
Qty: 0 | Refills: 0 | Status: COMPLETED | OUTPATIENT
Start: 2017-03-13 | End: 2017-03-13

## 2017-03-13 RX ORDER — PANTOPRAZOLE SODIUM 20 MG/1
40 TABLET, DELAYED RELEASE ORAL
Qty: 0 | Refills: 0 | Status: DISCONTINUED | OUTPATIENT
Start: 2017-03-13 | End: 2017-03-13

## 2017-03-13 RX ORDER — PIPERACILLIN AND TAZOBACTAM 4; .5 G/20ML; G/20ML
3.38 INJECTION, POWDER, LYOPHILIZED, FOR SOLUTION INTRAVENOUS EVERY 8 HOURS
Qty: 0 | Refills: 0 | Status: DISCONTINUED | OUTPATIENT
Start: 2017-03-13 | End: 2017-03-16

## 2017-03-13 RX ORDER — ASPIRIN/CALCIUM CARB/MAGNESIUM 324 MG
1 TABLET ORAL
Qty: 0 | Refills: 0 | COMMUNITY

## 2017-03-13 RX ORDER — POTASSIUM PHOSPHATE, MONOBASIC POTASSIUM PHOSPHATE, DIBASIC 236; 224 MG/ML; MG/ML
15 INJECTION, SOLUTION INTRAVENOUS ONCE
Qty: 0 | Refills: 0 | Status: COMPLETED | OUTPATIENT
Start: 2017-03-13 | End: 2017-03-13

## 2017-03-13 RX ORDER — CALCIUM CARBONATE 500(1250)
0 TABLET ORAL
Qty: 0 | Refills: 0 | COMMUNITY

## 2017-03-13 RX ORDER — MONTELUKAST 4 MG/1
1 TABLET, CHEWABLE ORAL
Qty: 0 | Refills: 0 | COMMUNITY

## 2017-03-13 RX ORDER — PIPERACILLIN AND TAZOBACTAM 4; .5 G/20ML; G/20ML
3.38 INJECTION, POWDER, LYOPHILIZED, FOR SOLUTION INTRAVENOUS ONCE
Qty: 0 | Refills: 0 | Status: COMPLETED | OUTPATIENT
Start: 2017-03-13 | End: 2017-03-13

## 2017-03-13 RX ADMIN — ONDANSETRON 4 MILLIGRAM(S): 8 TABLET, FILM COATED ORAL at 06:34

## 2017-03-13 RX ADMIN — PIPERACILLIN AND TAZOBACTAM 25 GRAM(S): 4; .5 INJECTION, POWDER, LYOPHILIZED, FOR SOLUTION INTRAVENOUS at 08:31

## 2017-03-13 RX ADMIN — PIPERACILLIN AND TAZOBACTAM 25 GRAM(S): 4; .5 INJECTION, POWDER, LYOPHILIZED, FOR SOLUTION INTRAVENOUS at 17:24

## 2017-03-13 RX ADMIN — HEPARIN SODIUM 5000 UNIT(S): 5000 INJECTION INTRAVENOUS; SUBCUTANEOUS at 14:00

## 2017-03-13 RX ADMIN — Medication 250 MILLIGRAM(S): at 02:02

## 2017-03-13 RX ADMIN — HEPARIN SODIUM 5000 UNIT(S): 5000 INJECTION INTRAVENOUS; SUBCUTANEOUS at 22:12

## 2017-03-13 RX ADMIN — PIPERACILLIN AND TAZOBACTAM 200 GRAM(S): 4; .5 INJECTION, POWDER, LYOPHILIZED, FOR SOLUTION INTRAVENOUS at 00:55

## 2017-03-13 RX ADMIN — SODIUM CHLORIDE 2000 MILLILITER(S): 9 INJECTION INTRAMUSCULAR; INTRAVENOUS; SUBCUTANEOUS at 11:47

## 2017-03-13 RX ADMIN — SODIUM CHLORIDE 100 MILLILITER(S): 9 INJECTION, SOLUTION INTRAVENOUS at 17:24

## 2017-03-13 RX ADMIN — SODIUM CHLORIDE 1000 MILLILITER(S): 9 INJECTION INTRAMUSCULAR; INTRAVENOUS; SUBCUTANEOUS at 06:25

## 2017-03-13 RX ADMIN — SODIUM CHLORIDE 100 MILLILITER(S): 9 INJECTION INTRAMUSCULAR; INTRAVENOUS; SUBCUTANEOUS at 11:00

## 2017-03-13 RX ADMIN — HEPARIN SODIUM 5000 UNIT(S): 5000 INJECTION INTRAVENOUS; SUBCUTANEOUS at 06:25

## 2017-03-13 RX ADMIN — Medication 650 MILLIGRAM(S): at 08:35

## 2017-03-13 RX ADMIN — Medication 12.5 MICROGRAM(S): at 06:34

## 2017-03-13 RX ADMIN — Medication 81 MILLIGRAM(S): at 14:21

## 2017-03-13 RX ADMIN — MORPHINE SULFATE 2 MILLIGRAM(S): 50 CAPSULE, EXTENDED RELEASE ORAL at 13:27

## 2017-03-13 RX ADMIN — MORPHINE SULFATE 2 MILLIGRAM(S): 50 CAPSULE, EXTENDED RELEASE ORAL at 13:43

## 2017-03-13 RX ADMIN — SIMVASTATIN 10 MILLIGRAM(S): 20 TABLET, FILM COATED ORAL at 22:12

## 2017-03-13 RX ADMIN — AZITHROMYCIN 250 MILLIGRAM(S): 500 TABLET, FILM COATED ORAL at 11:07

## 2017-03-13 RX ADMIN — POTASSIUM PHOSPHATE, MONOBASIC POTASSIUM PHOSPHATE, DIBASIC 62.5 MILLIMOLE(S): 236; 224 INJECTION, SOLUTION INTRAVENOUS at 12:23

## 2017-03-13 NOTE — PATIENT PROFILE ADULT. - INTERPRETATION SERVICES DECLINED
Patient/Caregiver requests family/friend to interpret./called  service to make sure to family as ainterpreter  [phone]

## 2017-03-14 LAB
ALBUMIN SERPL ELPH-MCNC: 2.5 G/DL — LOW (ref 3.3–5)
ALP SERPL-CCNC: 32 U/L — LOW (ref 40–120)
ALT FLD-CCNC: 19 U/L — SIGNIFICANT CHANGE UP (ref 4–33)
APTT BLD: 34 SEC — SIGNIFICANT CHANGE UP (ref 27.5–37.4)
AST SERPL-CCNC: 24 U/L — SIGNIFICANT CHANGE UP (ref 4–32)
BACTERIA UR CULT: SIGNIFICANT CHANGE UP
BASOPHILS # BLD AUTO: 0.01 K/UL — SIGNIFICANT CHANGE UP (ref 0–0.2)
BASOPHILS NFR BLD AUTO: 0.3 % — SIGNIFICANT CHANGE UP (ref 0–2)
BILIRUB SERPL-MCNC: 1 MG/DL — SIGNIFICANT CHANGE UP (ref 0.2–1.2)
BUN SERPL-MCNC: 16 MG/DL — SIGNIFICANT CHANGE UP (ref 7–23)
C DIFF TOX GENS STL QL NAA+PROBE: SIGNIFICANT CHANGE UP
CALCIUM SERPL-MCNC: 7.8 MG/DL — LOW (ref 8.4–10.5)
CHLORIDE SERPL-SCNC: 105 MMOL/L — SIGNIFICANT CHANGE UP (ref 98–107)
CO2 SERPL-SCNC: 20 MMOL/L — LOW (ref 22–31)
CREAT SERPL-MCNC: 0.83 MG/DL — SIGNIFICANT CHANGE UP (ref 0.5–1.3)
EOSINOPHIL # BLD AUTO: 0.04 K/UL — SIGNIFICANT CHANGE UP (ref 0–0.5)
EOSINOPHIL NFR BLD AUTO: 1.3 % — SIGNIFICANT CHANGE UP (ref 0–6)
GLUCOSE SERPL-MCNC: 101 MG/DL — HIGH (ref 70–99)
HCT VFR BLD CALC: 31.4 % — LOW (ref 34.5–45)
HGB BLD-MCNC: 10.3 G/DL — LOW (ref 11.5–15.5)
IMM GRANULOCYTES NFR BLD AUTO: 0.6 % — SIGNIFICANT CHANGE UP (ref 0–1.5)
INR BLD: 1.24 — HIGH (ref 0.87–1.18)
LYMPHOCYTES # BLD AUTO: 0.64 K/UL — LOW (ref 1–3.3)
LYMPHOCYTES # BLD AUTO: 20.7 % — SIGNIFICANT CHANGE UP (ref 13–44)
MAGNESIUM SERPL-MCNC: 1.7 MG/DL — SIGNIFICANT CHANGE UP (ref 1.6–2.6)
MCHC RBC-ENTMCNC: 27.6 PG — SIGNIFICANT CHANGE UP (ref 27–34)
MCHC RBC-ENTMCNC: 32.8 % — SIGNIFICANT CHANGE UP (ref 32–36)
MCV RBC AUTO: 84.2 FL — SIGNIFICANT CHANGE UP (ref 80–100)
MONOCYTES # BLD AUTO: 0.24 K/UL — SIGNIFICANT CHANGE UP (ref 0–0.9)
MONOCYTES NFR BLD AUTO: 7.8 % — SIGNIFICANT CHANGE UP (ref 2–14)
NEUTROPHILS # BLD AUTO: 2.14 K/UL — SIGNIFICANT CHANGE UP (ref 1.8–7.4)
NEUTROPHILS NFR BLD AUTO: 69.3 % — SIGNIFICANT CHANGE UP (ref 43–77)
PHOSPHATE SERPL-MCNC: 1.9 MG/DL — LOW (ref 2.5–4.5)
PLATELET # BLD AUTO: 119 K/UL — LOW (ref 150–400)
PMV BLD: 10.9 FL — SIGNIFICANT CHANGE UP (ref 7–13)
POTASSIUM SERPL-MCNC: 3.3 MMOL/L — LOW (ref 3.5–5.3)
POTASSIUM SERPL-SCNC: 3.3 MMOL/L — LOW (ref 3.5–5.3)
PROT SERPL-MCNC: 5.3 G/DL — LOW (ref 6–8.3)
PROTHROM AB SERPL-ACNC: 14.2 SEC — HIGH (ref 10–13.1)
RBC # BLD: 3.73 M/UL — LOW (ref 3.8–5.2)
RBC # FLD: 14.8 % — HIGH (ref 10.3–14.5)
SODIUM SERPL-SCNC: 139 MMOL/L — SIGNIFICANT CHANGE UP (ref 135–145)
SPECIMEN SOURCE: SIGNIFICANT CHANGE UP
WBC # BLD: 3.09 K/UL — LOW (ref 3.8–10.5)
WBC # FLD AUTO: 3.09 K/UL — LOW (ref 3.8–10.5)

## 2017-03-14 PROCEDURE — 99232 SBSQ HOSP IP/OBS MODERATE 35: CPT

## 2017-03-14 PROCEDURE — 99233 SBSQ HOSP IP/OBS HIGH 50: CPT | Mod: GC

## 2017-03-14 RX ORDER — POTASSIUM PHOSPHATE, MONOBASIC POTASSIUM PHOSPHATE, DIBASIC 236; 224 MG/ML; MG/ML
15 INJECTION, SOLUTION INTRAVENOUS ONCE
Qty: 0 | Refills: 0 | Status: COMPLETED | OUTPATIENT
Start: 2017-03-14 | End: 2017-03-14

## 2017-03-14 RX ORDER — VANCOMYCIN HCL 1 G
750 VIAL (EA) INTRAVENOUS ONCE
Qty: 0 | Refills: 0 | Status: COMPLETED | OUTPATIENT
Start: 2017-03-14 | End: 2017-03-14

## 2017-03-14 RX ORDER — POTASSIUM CHLORIDE 20 MEQ
10 PACKET (EA) ORAL
Qty: 0 | Refills: 0 | Status: COMPLETED | OUTPATIENT
Start: 2017-03-14 | End: 2017-03-14

## 2017-03-14 RX ORDER — LEVOTHYROXINE SODIUM 125 MCG
25 TABLET ORAL DAILY
Qty: 0 | Refills: 0 | Status: DISCONTINUED | OUTPATIENT
Start: 2017-03-14 | End: 2017-03-17

## 2017-03-14 RX ORDER — SODIUM CHLORIDE 9 MG/ML
1000 INJECTION, SOLUTION INTRAVENOUS
Qty: 0 | Refills: 0 | Status: DISCONTINUED | OUTPATIENT
Start: 2017-03-14 | End: 2017-03-16

## 2017-03-14 RX ORDER — VANCOMYCIN HCL 1 G
750 VIAL (EA) INTRAVENOUS EVERY 12 HOURS
Qty: 0 | Refills: 0 | Status: DISCONTINUED | OUTPATIENT
Start: 2017-03-14 | End: 2017-03-15

## 2017-03-14 RX ORDER — POTASSIUM CHLORIDE 20 MEQ
40 PACKET (EA) ORAL EVERY 4 HOURS
Qty: 0 | Refills: 0 | Status: DISCONTINUED | OUTPATIENT
Start: 2017-03-14 | End: 2017-03-14

## 2017-03-14 RX ADMIN — Medication 100 MILLIEQUIVALENT(S): at 17:39

## 2017-03-14 RX ADMIN — HEPARIN SODIUM 5000 UNIT(S): 5000 INJECTION INTRAVENOUS; SUBCUTANEOUS at 13:40

## 2017-03-14 RX ADMIN — AZITHROMYCIN 250 MILLIGRAM(S): 500 TABLET, FILM COATED ORAL at 09:17

## 2017-03-14 RX ADMIN — Medication 150 MILLIGRAM(S): at 01:41

## 2017-03-14 RX ADMIN — Medication 150 MILLIGRAM(S): at 18:25

## 2017-03-14 RX ADMIN — Medication 81 MILLIGRAM(S): at 11:20

## 2017-03-14 RX ADMIN — Medication 12.5 MICROGRAM(S): at 05:13

## 2017-03-14 RX ADMIN — SODIUM CHLORIDE 75 MILLILITER(S): 9 INJECTION, SOLUTION INTRAVENOUS at 15:22

## 2017-03-14 RX ADMIN — Medication 100 MILLIEQUIVALENT(S): at 13:40

## 2017-03-14 RX ADMIN — ONDANSETRON 4 MILLIGRAM(S): 8 TABLET, FILM COATED ORAL at 11:53

## 2017-03-14 RX ADMIN — HEPARIN SODIUM 5000 UNIT(S): 5000 INJECTION INTRAVENOUS; SUBCUTANEOUS at 05:13

## 2017-03-14 RX ADMIN — PIPERACILLIN AND TAZOBACTAM 25 GRAM(S): 4; .5 INJECTION, POWDER, LYOPHILIZED, FOR SOLUTION INTRAVENOUS at 11:18

## 2017-03-14 RX ADMIN — HEPARIN SODIUM 5000 UNIT(S): 5000 INJECTION INTRAVENOUS; SUBCUTANEOUS at 21:44

## 2017-03-14 RX ADMIN — PIPERACILLIN AND TAZOBACTAM 25 GRAM(S): 4; .5 INJECTION, POWDER, LYOPHILIZED, FOR SOLUTION INTRAVENOUS at 20:17

## 2017-03-14 RX ADMIN — Medication 100 MILLIEQUIVALENT(S): at 15:21

## 2017-03-14 RX ADMIN — SODIUM CHLORIDE 100 MILLILITER(S): 9 INJECTION, SOLUTION INTRAVENOUS at 05:14

## 2017-03-14 RX ADMIN — ONDANSETRON 4 MILLIGRAM(S): 8 TABLET, FILM COATED ORAL at 19:59

## 2017-03-14 RX ADMIN — PIPERACILLIN AND TAZOBACTAM 25 GRAM(S): 4; .5 INJECTION, POWDER, LYOPHILIZED, FOR SOLUTION INTRAVENOUS at 03:21

## 2017-03-14 RX ADMIN — POTASSIUM PHOSPHATE, MONOBASIC POTASSIUM PHOSPHATE, DIBASIC 62.5 MILLIMOLE(S): 236; 224 INJECTION, SOLUTION INTRAVENOUS at 11:18

## 2017-03-14 RX ADMIN — SODIUM CHLORIDE 75 MILLILITER(S): 9 INJECTION, SOLUTION INTRAVENOUS at 17:40

## 2017-03-15 LAB
BUN SERPL-MCNC: 15 MG/DL — SIGNIFICANT CHANGE UP (ref 7–23)
CALCIUM SERPL-MCNC: 8.4 MG/DL — SIGNIFICANT CHANGE UP (ref 8.4–10.5)
CHLORIDE SERPL-SCNC: 102 MMOL/L — SIGNIFICANT CHANGE UP (ref 98–107)
CO2 SERPL-SCNC: 23 MMOL/L — SIGNIFICANT CHANGE UP (ref 22–31)
CREAT SERPL-MCNC: 0.71 MG/DL — SIGNIFICANT CHANGE UP (ref 0.5–1.3)
GLUCOSE SERPL-MCNC: 115 MG/DL — HIGH (ref 70–99)
L PNEUMO AG UR QL: NEGATIVE — SIGNIFICANT CHANGE UP
MAGNESIUM SERPL-MCNC: 1.8 MG/DL — SIGNIFICANT CHANGE UP (ref 1.6–2.6)
PHOSPHATE SERPL-MCNC: 1.8 MG/DL — LOW (ref 2.5–4.5)
POTASSIUM SERPL-MCNC: 3.9 MMOL/L — SIGNIFICANT CHANGE UP (ref 3.5–5.3)
POTASSIUM SERPL-SCNC: 3.9 MMOL/L — SIGNIFICANT CHANGE UP (ref 3.5–5.3)
SODIUM SERPL-SCNC: 139 MMOL/L — SIGNIFICANT CHANGE UP (ref 135–145)

## 2017-03-15 PROCEDURE — 99232 SBSQ HOSP IP/OBS MODERATE 35: CPT | Mod: GC

## 2017-03-15 PROCEDURE — 99233 SBSQ HOSP IP/OBS HIGH 50: CPT | Mod: GC

## 2017-03-15 RX ORDER — POTASSIUM PHOSPHATE, MONOBASIC POTASSIUM PHOSPHATE, DIBASIC 236; 224 MG/ML; MG/ML
15 INJECTION, SOLUTION INTRAVENOUS ONCE
Qty: 0 | Refills: 0 | Status: COMPLETED | OUTPATIENT
Start: 2017-03-15 | End: 2017-03-15

## 2017-03-15 RX ORDER — SODIUM,POTASSIUM PHOSPHATES 278-250MG
1 POWDER IN PACKET (EA) ORAL
Qty: 0 | Refills: 0 | Status: COMPLETED | OUTPATIENT
Start: 2017-03-15 | End: 2017-03-16

## 2017-03-15 RX ORDER — ONDANSETRON 8 MG/1
4 TABLET, FILM COATED ORAL EVERY 6 HOURS
Qty: 0 | Refills: 0 | Status: DISCONTINUED | OUTPATIENT
Start: 2017-03-15 | End: 2017-03-16

## 2017-03-15 RX ADMIN — PIPERACILLIN AND TAZOBACTAM 25 GRAM(S): 4; .5 INJECTION, POWDER, LYOPHILIZED, FOR SOLUTION INTRAVENOUS at 22:09

## 2017-03-15 RX ADMIN — HEPARIN SODIUM 5000 UNIT(S): 5000 INJECTION INTRAVENOUS; SUBCUTANEOUS at 05:20

## 2017-03-15 RX ADMIN — AZITHROMYCIN 250 MILLIGRAM(S): 500 TABLET, FILM COATED ORAL at 10:22

## 2017-03-15 RX ADMIN — PIPERACILLIN AND TAZOBACTAM 25 GRAM(S): 4; .5 INJECTION, POWDER, LYOPHILIZED, FOR SOLUTION INTRAVENOUS at 03:52

## 2017-03-15 RX ADMIN — Medication 81 MILLIGRAM(S): at 12:50

## 2017-03-15 RX ADMIN — HEPARIN SODIUM 5000 UNIT(S): 5000 INJECTION INTRAVENOUS; SUBCUTANEOUS at 22:09

## 2017-03-15 RX ADMIN — SODIUM CHLORIDE 75 MILLILITER(S): 9 INJECTION, SOLUTION INTRAVENOUS at 22:09

## 2017-03-15 RX ADMIN — PIPERACILLIN AND TAZOBACTAM 25 GRAM(S): 4; .5 INJECTION, POWDER, LYOPHILIZED, FOR SOLUTION INTRAVENOUS at 12:46

## 2017-03-15 RX ADMIN — HEPARIN SODIUM 5000 UNIT(S): 5000 INJECTION INTRAVENOUS; SUBCUTANEOUS at 12:52

## 2017-03-15 RX ADMIN — ONDANSETRON 4 MILLIGRAM(S): 8 TABLET, FILM COATED ORAL at 04:03

## 2017-03-15 RX ADMIN — POTASSIUM PHOSPHATE, MONOBASIC POTASSIUM PHOSPHATE, DIBASIC 62.5 MILLIMOLE(S): 236; 224 INJECTION, SOLUTION INTRAVENOUS at 12:46

## 2017-03-15 RX ADMIN — Medication 25 MICROGRAM(S): at 05:20

## 2017-03-15 RX ADMIN — Medication 150 MILLIGRAM(S): at 05:57

## 2017-03-15 NOTE — PHYSICAL THERAPY INITIAL EVALUATION ADULT - ADDITIONAL COMMENTS
Per daughter: pt lives in daughter's house without steps to enter and 1 flight to bedroom. Pt ambulated independently without assistive devices.

## 2017-03-15 NOTE — PHYSICAL THERAPY INITIAL EVALUATION ADULT - PERTINENT HX OF CURRENT PROBLEM, REHAB EVAL
76yo Kazakh speaking F with a hx of HTN, HLD and hypothyroidism, recent admission for influenza presents to ED with fever (Tmax of 102) and vomiting.

## 2017-03-16 LAB
BASOPHILS # BLD AUTO: 0.02 K/UL — SIGNIFICANT CHANGE UP (ref 0–0.2)
BASOPHILS NFR BLD AUTO: 0.3 % — SIGNIFICANT CHANGE UP (ref 0–2)
BUN SERPL-MCNC: 14 MG/DL — SIGNIFICANT CHANGE UP (ref 7–23)
CALCIUM SERPL-MCNC: 8.9 MG/DL — SIGNIFICANT CHANGE UP (ref 8.4–10.5)
CHLORIDE SERPL-SCNC: 104 MMOL/L — SIGNIFICANT CHANGE UP (ref 98–107)
CO2 SERPL-SCNC: 22 MMOL/L — SIGNIFICANT CHANGE UP (ref 22–31)
CREAT SERPL-MCNC: 0.81 MG/DL — SIGNIFICANT CHANGE UP (ref 0.5–1.3)
EOSINOPHIL # BLD AUTO: 0.07 K/UL — SIGNIFICANT CHANGE UP (ref 0–0.5)
EOSINOPHIL NFR BLD AUTO: 0.9 % — SIGNIFICANT CHANGE UP (ref 0–6)
GLUCOSE SERPL-MCNC: 98 MG/DL — SIGNIFICANT CHANGE UP (ref 70–99)
HCT VFR BLD CALC: 31.4 % — LOW (ref 34.5–45)
HGB BLD-MCNC: 10.5 G/DL — LOW (ref 11.5–15.5)
IMM GRANULOCYTES NFR BLD AUTO: 0.5 % — SIGNIFICANT CHANGE UP (ref 0–1.5)
LYMPHOCYTES # BLD AUTO: 2.19 K/UL — SIGNIFICANT CHANGE UP (ref 1–3.3)
LYMPHOCYTES # BLD AUTO: 27.8 % — SIGNIFICANT CHANGE UP (ref 13–44)
MAGNESIUM SERPL-MCNC: 2.3 MG/DL — SIGNIFICANT CHANGE UP (ref 1.6–2.6)
MCHC RBC-ENTMCNC: 27.4 PG — SIGNIFICANT CHANGE UP (ref 27–34)
MCHC RBC-ENTMCNC: 33.4 % — SIGNIFICANT CHANGE UP (ref 32–36)
MCV RBC AUTO: 82 FL — SIGNIFICANT CHANGE UP (ref 80–100)
MONOCYTES # BLD AUTO: 0.66 K/UL — SIGNIFICANT CHANGE UP (ref 0–0.9)
MONOCYTES NFR BLD AUTO: 8.4 % — SIGNIFICANT CHANGE UP (ref 2–14)
NEUTROPHILS # BLD AUTO: 4.9 K/UL — SIGNIFICANT CHANGE UP (ref 1.8–7.4)
NEUTROPHILS NFR BLD AUTO: 62.1 % — SIGNIFICANT CHANGE UP (ref 43–77)
PHOSPHATE SERPL-MCNC: 2.2 MG/DL — LOW (ref 2.5–4.5)
PLATELET # BLD AUTO: 141 K/UL — LOW (ref 150–400)
PMV BLD: 11 FL — SIGNIFICANT CHANGE UP (ref 7–13)
POTASSIUM SERPL-MCNC: 3.7 MMOL/L — SIGNIFICANT CHANGE UP (ref 3.5–5.3)
POTASSIUM SERPL-SCNC: 3.7 MMOL/L — SIGNIFICANT CHANGE UP (ref 3.5–5.3)
RBC # BLD: 3.83 M/UL — SIGNIFICANT CHANGE UP (ref 3.8–5.2)
RBC # FLD: 14.1 % — SIGNIFICANT CHANGE UP (ref 10.3–14.5)
SODIUM SERPL-SCNC: 141 MMOL/L — SIGNIFICANT CHANGE UP (ref 135–145)
WBC # BLD: 7.88 K/UL — SIGNIFICANT CHANGE UP (ref 3.8–10.5)
WBC # FLD AUTO: 7.88 K/UL — SIGNIFICANT CHANGE UP (ref 3.8–10.5)

## 2017-03-16 PROCEDURE — 99232 SBSQ HOSP IP/OBS MODERATE 35: CPT | Mod: GC

## 2017-03-16 PROCEDURE — 99233 SBSQ HOSP IP/OBS HIGH 50: CPT | Mod: GC

## 2017-03-16 RX ORDER — ONDANSETRON 8 MG/1
4 TABLET, FILM COATED ORAL EVERY 8 HOURS
Qty: 0 | Refills: 0 | Status: DISCONTINUED | OUTPATIENT
Start: 2017-03-16 | End: 2017-03-17

## 2017-03-16 RX ORDER — POTASSIUM PHOSPHATE, MONOBASIC POTASSIUM PHOSPHATE, DIBASIC 236; 224 MG/ML; MG/ML
15 INJECTION, SOLUTION INTRAVENOUS ONCE
Qty: 0 | Refills: 0 | Status: COMPLETED | OUTPATIENT
Start: 2017-03-16 | End: 2017-03-16

## 2017-03-16 RX ADMIN — HEPARIN SODIUM 5000 UNIT(S): 5000 INJECTION INTRAVENOUS; SUBCUTANEOUS at 13:12

## 2017-03-16 RX ADMIN — HEPARIN SODIUM 5000 UNIT(S): 5000 INJECTION INTRAVENOUS; SUBCUTANEOUS at 21:34

## 2017-03-16 RX ADMIN — POTASSIUM PHOSPHATE, MONOBASIC POTASSIUM PHOSPHATE, DIBASIC 62.5 MILLIMOLE(S): 236; 224 INJECTION, SOLUTION INTRAVENOUS at 10:04

## 2017-03-16 RX ADMIN — PIPERACILLIN AND TAZOBACTAM 25 GRAM(S): 4; .5 INJECTION, POWDER, LYOPHILIZED, FOR SOLUTION INTRAVENOUS at 05:05

## 2017-03-16 RX ADMIN — PIPERACILLIN AND TAZOBACTAM 25 GRAM(S): 4; .5 INJECTION, POWDER, LYOPHILIZED, FOR SOLUTION INTRAVENOUS at 13:12

## 2017-03-16 RX ADMIN — SIMVASTATIN 10 MILLIGRAM(S): 20 TABLET, FILM COATED ORAL at 21:34

## 2017-03-16 RX ADMIN — HEPARIN SODIUM 5000 UNIT(S): 5000 INJECTION INTRAVENOUS; SUBCUTANEOUS at 05:06

## 2017-03-16 RX ADMIN — Medication 1 TABLET(S): at 10:04

## 2017-03-16 RX ADMIN — Medication 81 MILLIGRAM(S): at 12:09

## 2017-03-17 ENCOUNTER — TRANSCRIPTION ENCOUNTER (OUTPATIENT)
Age: 77
End: 2017-03-17

## 2017-03-17 VITALS
TEMPERATURE: 99 F | HEART RATE: 69 BPM | DIASTOLIC BLOOD PRESSURE: 73 MMHG | SYSTOLIC BLOOD PRESSURE: 118 MMHG | OXYGEN SATURATION: 100 % | RESPIRATION RATE: 18 BRPM

## 2017-03-17 LAB
BACTERIA BLD CULT: SIGNIFICANT CHANGE UP
BACTERIA BLD CULT: SIGNIFICANT CHANGE UP
BUN SERPL-MCNC: 12 MG/DL — SIGNIFICANT CHANGE UP (ref 7–23)
CALCIUM SERPL-MCNC: 8.7 MG/DL — SIGNIFICANT CHANGE UP (ref 8.4–10.5)
CHLORIDE SERPL-SCNC: 102 MMOL/L — SIGNIFICANT CHANGE UP (ref 98–107)
CO2 SERPL-SCNC: 22 MMOL/L — SIGNIFICANT CHANGE UP (ref 22–31)
CREAT SERPL-MCNC: 0.66 MG/DL — SIGNIFICANT CHANGE UP (ref 0.5–1.3)
GLUCOSE SERPL-MCNC: 97 MG/DL — SIGNIFICANT CHANGE UP (ref 70–99)
MAGNESIUM SERPL-MCNC: 2 MG/DL — SIGNIFICANT CHANGE UP (ref 1.6–2.6)
PHOSPHATE SERPL-MCNC: 3.3 MG/DL — SIGNIFICANT CHANGE UP (ref 2.5–4.5)
POTASSIUM SERPL-MCNC: 3.4 MMOL/L — LOW (ref 3.5–5.3)
POTASSIUM SERPL-SCNC: 3.4 MMOL/L — LOW (ref 3.5–5.3)
SODIUM SERPL-SCNC: 138 MMOL/L — SIGNIFICANT CHANGE UP (ref 135–145)
SPECIMEN SOURCE: SIGNIFICANT CHANGE UP

## 2017-03-17 PROCEDURE — 99232 SBSQ HOSP IP/OBS MODERATE 35: CPT | Mod: GC

## 2017-03-17 PROCEDURE — 99239 HOSP IP/OBS DSCHRG MGMT >30: CPT

## 2017-03-17 RX ORDER — POTASSIUM CHLORIDE 20 MEQ
40 PACKET (EA) ORAL ONCE
Qty: 0 | Refills: 0 | Status: COMPLETED | OUTPATIENT
Start: 2017-03-17 | End: 2017-03-17

## 2017-03-17 RX ADMIN — HEPARIN SODIUM 5000 UNIT(S): 5000 INJECTION INTRAVENOUS; SUBCUTANEOUS at 13:32

## 2017-03-17 RX ADMIN — Medication 81 MILLIGRAM(S): at 11:32

## 2017-03-17 RX ADMIN — HEPARIN SODIUM 5000 UNIT(S): 5000 INJECTION INTRAVENOUS; SUBCUTANEOUS at 05:07

## 2017-03-17 RX ADMIN — Medication 40 MILLIEQUIVALENT(S): at 13:31

## 2017-03-17 RX ADMIN — Medication 25 MICROGRAM(S): at 05:06

## 2017-03-17 NOTE — DISCHARGE NOTE ADULT - PROVIDER TOKENS
FREE:[LAST:[[facility]],FIRST:[[facility]],PHONE:[phone]],FAX:[phone]    -],ADDRESS:[[address] [place] [address]  [phone]]]

## 2017-03-17 NOTE — DISCHARGE NOTE ADULT - CARE PLAN
Principal Discharge DX:	Pneumonia  Secondary Diagnosis:	Essential hypertension  Secondary Diagnosis:	Hypothyroidism, unspecified type Principal Discharge DX:	Pneumonia  Goal:	Follow up with clinic  Instructions for follow-up, activity and diet:	You were admitted in the hospital for infection in your lungs. You were treated with antibiotics for 5-days.  Please follow up with the [facility] free clinic @ [phone] within 2 weeks of discharge from the hospital. The address is provided in the discharge paperwork.  Secondary Diagnosis:	Essential hypertension  Goal:	Good BP control  Instructions for follow-up, activity and diet:	Please take your medications as it is prescribed by your medical doctors. Follow up regularly with the free [facility] for continued management and check up of your medical problems.  Secondary Diagnosis:	Hypothyroidism, unspecified type  Instructions for follow-up, activity and diet:	Please take your Synthroid as prescribed. Follow up with your doctor to monitor your thyroid hormones at regular intervals. Principal Discharge DX:	Pneumonia  Goal:	Follow up with clinic  Instructions for follow-up, activity and diet:	You were admitted in the hospital for infection in your lungs. You were treated with antibiotics for 5-days.  Please follow up with the [facility] free clinic @ [phone] within 2 weeks of discharge from the hospital. The address is provided in the discharge paperwork.  Secondary Diagnosis:	Essential hypertension  Goal:	Good BP control  Instructions for follow-up, activity and diet:	Please take your medications as it is prescribed by your medical doctors. Follow up regularly with the free [facility] for continued management and check up of your medical problems.  Secondary Diagnosis:	Hypothyroidism, unspecified type  Instructions for follow-up, activity and diet:	Please take your Synthroid as prescribed. Follow up with your doctor to monitor your thyroid hormones at regular intervals. Principal Discharge DX:	Pneumonia  Goal:	Follow up with clinic  Instructions for follow-up, activity and diet:	You were admitted in the hospital for infection in your lungs. You were treated with antibiotics for 5-days.  Please follow up with the [facility] free clinic @ [phone] within 2 weeks of discharge from the hospital. The address is provided in the discharge paperwork.  Secondary Diagnosis:	Essential hypertension  Goal:	Good BP control  Instructions for follow-up, activity and diet:	Please take your medications as it is prescribed by your medical doctors. Follow up regularly with the free [facility] for continued management and check up of your medical problems.  Secondary Diagnosis:	Hypothyroidism, unspecified type  Instructions for follow-up, activity and diet:	Please take your Synthroid as prescribed. Follow up with your doctor to monitor your thyroid hormones at regular intervals.

## 2017-03-17 NOTE — DISCHARGE NOTE ADULT - MEDICATION SUMMARY - MEDICATIONS TO TAKE
I will START or STAY ON the medications listed below when I get home from the hospital:    aspirin 81 mg oral tablet  -- 1 tab(s) by mouth once a day  -- Indication: For HLD (hyperlipidemia)    acetaminophen 325 mg oral tablet  -- 2 tab(s) by mouth every 6 hours, As needed, For Temp greater than 38 C (100.4 F)  -- Indication: For Pain    promethazine 25 mg oral tablet  -- 0.5 tab(s) by mouth every 6 hours, As needed, Nausea, vomiting  -- Indication: For Nausea    simvastatin 10 mg oral tablet  -- 1 tab(s) by mouth once a day (at bedtime)  -- Indication: For HLD (hyperlipidemia)    alendronate 70 mg oral tablet  -- 1 tab(s) by mouth once a week on Tuesday  -- Indication: For Osteoporosis    amLODIPine 2.5 mg oral tablet  -- 1 tab(s) by mouth once a day  -- Indication: For Essential hypertension    levothyroxine 25 mcg (0.025 mg) oral tablet  -- 1 tab(s) by mouth once a day  -- Indication: For Hypothyroidism, unspecified type    Calcium 500+D  -- 1 tab(s) by mouth 2 times a day  -- Indication: For Preventive measure

## 2017-03-17 NOTE — DISCHARGE NOTE ADULT - PATIENT PORTAL LINK FT
“You can access the [facility] Patient Portal, offered by [facility], by registering with the following website: [email or web address]”

## 2017-03-17 NOTE — DISCHARGE NOTE ADULT - PLAN OF CARE
Follow up with clinic You were admitted in the hospital for infection in your lungs. You were treated with antibiotics for 5-days.  Please follow up with the [facility] @ [phone] within 2 weeks of discharge from the hospital. The address is provided in the discharge paperwork. Good BP control Please take your medications as it is prescribed by your medical doctors. Follow up regularly with the free clinic for continued management and check up of your medical problems. Please take your Synthroid as prescribed. Follow up with your doctor to monitor your thyroid hormones at regular intervals.

## 2017-03-17 NOTE — DISCHARGE NOTE ADULT - HOSPITAL COURSE
76yo Luxembourgish speaking F with a hx of HTN, HLD and hypothyroidism, recent admission for influenza presents to ED with fever (Tmax of 102) and vomiting. Last night, patient began to have body aches. Around 3am, she felt nauseous and vomited. She has vomited (nbnb) 4 times in the day and has been unable to tolerate anything po. Granddaughter gave her odansetron 4mg which minimally helped. Granddaughter states pt was doing well after her discharge until last night. Pt also reports chills earlier today 76yo German speaking F with a hx of HTN, HLD and hypothyroidism, recent admission for influenza presents to ED with fever (Tmax of 102) and vomiting. Last night, patient began to have body aches. Around 3am, she felt nauseous and vomited. She has vomited (nbnb) 4 times in the day and has been unable to tolerate anything po. Granddaughter gave her odansetron 4mg which minimally helped. Granddaughter states pt was doing well after her discharge until last night. Pt also reports chills earlier during the day.    In the ED, Levaquin in ED and 2L NS. Pt and granddaughter noticed a rash on neck (no itchiness, no swelling, no hives)-unclear if old or after Levaquin. Given benadryl 25mg IV x 1. Also had 1 episode of watery diarrhea in ED. After, pt felt acutely weak BP 90/60-diaphoretic and pale. She received extra 1 NS bolus. She got a chest x-ray that showed hazy left basilar opacity.    On the floor, received further fluid resuscitations and was started on vancomycin, zosyn and azithromycin for broad coverage. She required increasing oxygen need and was put on supplemental oxygen. A CT scan of the chest was obtained and showed patchy ground glass opacities, which may relate partially to atelectasis and partially due to the expiratory phase of the study. Legionella test was sent and was negative. Oxygen was successfully weaned on day 3 of hospital stay. She had ongoing diarrhea for which she got a CT abdomen and pelvis, which showed no bowel obstruction and mild bibasilar atelectasis. A stool culture was obtained as well, which was negative for bacterial infections.  Pt was continued on antibiotics and completed a 5-day course. She clinically improved, diarrhea improved and patient remained afebrile and stable. She was seen by PT, and was able to ambulate and tolerate oral intake without nausea or vomiting. She was discharged in stable medical condition. She is to follow up with [facility] clinic with 7-10 days of discharge from the hospital. 76yo Greenlandic speaking F with a hx of HTN, HLD and hypothyroidism, recent admission for influenza presents to ED with fever (Tmax of 102) and vomiting. Last night, patient began to have body aches. Around 3am, she felt nauseous and vomited. She has vomited (nbnb) 4 times in the day and has been unable to tolerate anything po. Granddaughter gave her odansetron 4mg which minimally helped. Granddaughter states pt was doing well after her discharge until last night. Pt also reports chills earlier during the day.  In the ED, Levaquin in ED and 2L NS. Pt and granddaughter noticed a rash on neck (no itchiness, no swelling, no hives)-unclear if old or after Levaquin. Given benadryl 25mg IV x 1. Also had 1 episode of watery diarrhea in ED. After, pt felt acutely weak BP 90/60-diaphoretic and pale. She received extra 1 NS bolus. She got a chest x-ray that showed hazy left basilar opacity.  On the medicine service, pt received further fluid resuscitation and was started on vancomycin, zosyn and azithromycin for broad empiric coverage. She required increasing oxygen need and was put on supplemental oxygen. A CT scan of the chest was obtained and showed patchy ground glass opacities, which may relate partially to atelectasis and partially due to the expiratory phase of the study. Legionella test was sent and was negative. Oxygen was successfully weaned on day 3 of hospital stay. She had ongoing diarrhea for which she got a CT abdomen and pelvis, which showed no bowel obstruction and mild bibasilar atelectasis. A stool culture was obtained as well, which was negative for bacterial infections.  Pt was taken off antibiotics as there were no signs of bacterial infection with recommendations of Dr. Lucas of Infectious Disease. Pt clinically improved, diarrhea improved and patient remained afebrile and stable. She was seen by PT, and was able to ambulate and tolerate oral intake without nausea or vomiting. She was discharged in stable medical condition. She is to follow up with [facility] clinic with 7-10 days of discharge from the hospital.

## 2017-03-18 LAB
BACTERIA BLD CULT: SIGNIFICANT CHANGE UP
BACTERIA BLD CULT: SIGNIFICANT CHANGE UP
BACTERIA STL CULT: SIGNIFICANT CHANGE UP
O+P SPEC CONC: SIGNIFICANT CHANGE UP
SPECIMEN SOURCE: SIGNIFICANT CHANGE UP
TRI STN SPEC: SIGNIFICANT CHANGE UP

## 2017-03-24 ENCOUNTER — TRANSCRIPTION ENCOUNTER (OUTPATIENT)
Age: 77
End: 2017-03-24

## 2017-10-12 ENCOUNTER — EMERGENCY (EMERGENCY)
Facility: HOSPITAL | Age: 77
LOS: 1 days | Discharge: HOME | End: 2017-10-12
Attending: EMERGENCY MEDICINE | Admitting: EMERGENCY MEDICINE
Payer: MEDICAID

## 2017-10-12 VITALS
HEART RATE: 105 BPM | SYSTOLIC BLOOD PRESSURE: 156 MMHG | TEMPERATURE: 97 F | OXYGEN SATURATION: 99 % | DIASTOLIC BLOOD PRESSURE: 90 MMHG | RESPIRATION RATE: 18 BRPM

## 2017-10-12 DIAGNOSIS — H04.559 ACQUIRED STENOSIS OF UNSPECIFIED NASOLACRIMAL DUCT: Chronic | ICD-10-CM

## 2017-10-12 LAB
ALBUMIN SERPL ELPH-MCNC: 4.7 G/DL — SIGNIFICANT CHANGE UP (ref 3.3–5)
ALP SERPL-CCNC: 72 U/L — SIGNIFICANT CHANGE UP (ref 40–120)
ALT FLD-CCNC: 18 U/L — SIGNIFICANT CHANGE UP (ref 4–33)
AST SERPL-CCNC: 22 U/L — SIGNIFICANT CHANGE UP (ref 4–32)
BASOPHILS # BLD AUTO: 0.06 K/UL — SIGNIFICANT CHANGE UP (ref 0–0.2)
BASOPHILS NFR BLD AUTO: 0.5 % — SIGNIFICANT CHANGE UP (ref 0–2)
BILIRUB SERPL-MCNC: 1.6 MG/DL — HIGH (ref 0.2–1.2)
BUN SERPL-MCNC: 17 MG/DL — SIGNIFICANT CHANGE UP (ref 7–23)
CALCIUM SERPL-MCNC: 9.2 MG/DL — SIGNIFICANT CHANGE UP (ref 8.4–10.5)
CHLORIDE SERPL-SCNC: 105 MMOL/L — SIGNIFICANT CHANGE UP (ref 98–107)
CK MB BLD-MCNC: 1.19 NG/ML — SIGNIFICANT CHANGE UP (ref 1–4.7)
CK SERPL-CCNC: 88 U/L — SIGNIFICANT CHANGE UP (ref 25–170)
CO2 SERPL-SCNC: 23 MMOL/L — SIGNIFICANT CHANGE UP (ref 22–31)
CREAT SERPL-MCNC: 1.1 MG/DL — SIGNIFICANT CHANGE UP (ref 0.5–1.3)
EOSINOPHIL # BLD AUTO: 0.19 K/UL — SIGNIFICANT CHANGE UP (ref 0–0.5)
EOSINOPHIL NFR BLD AUTO: 1.7 % — SIGNIFICANT CHANGE UP (ref 0–6)
GLUCOSE SERPL-MCNC: 101 MG/DL — HIGH (ref 70–99)
HCT VFR BLD CALC: 39.2 % — SIGNIFICANT CHANGE UP (ref 34.5–45)
HGB BLD-MCNC: 12.8 G/DL — SIGNIFICANT CHANGE UP (ref 11.5–15.5)
IMM GRANULOCYTES # BLD AUTO: 0.05 # — SIGNIFICANT CHANGE UP
IMM GRANULOCYTES NFR BLD AUTO: 0.4 % — SIGNIFICANT CHANGE UP (ref 0–1.5)
LYMPHOCYTES # BLD AUTO: 1.41 K/UL — SIGNIFICANT CHANGE UP (ref 1–3.3)
LYMPHOCYTES # BLD AUTO: 12.4 % — LOW (ref 13–44)
MCHC RBC-ENTMCNC: 27.6 PG — SIGNIFICANT CHANGE UP (ref 27–34)
MCHC RBC-ENTMCNC: 32.7 % — SIGNIFICANT CHANGE UP (ref 32–36)
MCV RBC AUTO: 84.7 FL — SIGNIFICANT CHANGE UP (ref 80–100)
MONOCYTES # BLD AUTO: 0.78 K/UL — SIGNIFICANT CHANGE UP (ref 0–0.9)
MONOCYTES NFR BLD AUTO: 6.9 % — SIGNIFICANT CHANGE UP (ref 2–14)
NEUTROPHILS # BLD AUTO: 8.87 K/UL — HIGH (ref 1.8–7.4)
NEUTROPHILS NFR BLD AUTO: 78.1 % — HIGH (ref 43–77)
NRBC # FLD: 0 — SIGNIFICANT CHANGE UP
PLATELET # BLD AUTO: 206 K/UL — SIGNIFICANT CHANGE UP (ref 150–400)
PMV BLD: 10.5 FL — SIGNIFICANT CHANGE UP (ref 7–13)
POTASSIUM SERPL-MCNC: 4.5 MMOL/L — SIGNIFICANT CHANGE UP (ref 3.5–5.3)
POTASSIUM SERPL-SCNC: 4.5 MMOL/L — SIGNIFICANT CHANGE UP (ref 3.5–5.3)
PROT SERPL-MCNC: 7.4 G/DL — SIGNIFICANT CHANGE UP (ref 6–8.3)
RBC # BLD: 4.63 M/UL — SIGNIFICANT CHANGE UP (ref 3.8–5.2)
RBC # FLD: 14.6 % — HIGH (ref 10.3–14.5)
SODIUM SERPL-SCNC: 142 MMOL/L — SIGNIFICANT CHANGE UP (ref 135–145)
TROPONIN T SERPL-MCNC: < 0.06 NG/ML — SIGNIFICANT CHANGE UP (ref 0–0.06)
TSH SERPL-MCNC: 20.52 UIU/ML — HIGH (ref 0.27–4.2)
WBC # BLD: 11.36 K/UL — HIGH (ref 3.8–10.5)
WBC # FLD AUTO: 11.36 K/UL — HIGH (ref 3.8–10.5)

## 2017-10-12 PROCEDURE — 99284 EMERGENCY DEPT VISIT MOD MDM: CPT

## 2017-10-12 PROCEDURE — 71020: CPT | Mod: 26

## 2017-10-12 NOTE — ED ADULT NURSE NOTE - OBJECTIVE STATEMENT
78 y/o female pt, aox3, ambulatory, room 19, accompanied by niece, presents to the ED with c/o dry cough from August, believes cough caused pain on both lower abd, urinary frequency and some periods of incontinence. Pt sts that she feels drops of urine when she coughs, also with intermittent chest tightness r/t cough. Pt also reports that she feels something in her perineal area that is "blocking something." MD cancino done, 20G placed on L AC, VS as noted, NAD, will cont to monitor.

## 2017-10-12 NOTE — ED ADULT TRIAGE NOTE - CHIEF COMPLAINT QUOTE
pt. c/o a non-productive cough x 3 weeks, HIDALGO and pelvic pain w/ a sensation that "something feels like is going come out" x 1 week.  Pt. reports urinary incontinence and frequency x 4 days and chest pressure.  PMHx HTN, HLD, hypothyroidism.

## 2017-10-13 VITALS
RESPIRATION RATE: 21 BRPM | DIASTOLIC BLOOD PRESSURE: 76 MMHG | TEMPERATURE: 98 F | HEART RATE: 103 BPM | SYSTOLIC BLOOD PRESSURE: 128 MMHG | OXYGEN SATURATION: 96 %

## 2017-10-13 LAB
APPEARANCE UR: CLEAR — SIGNIFICANT CHANGE UP
BILIRUB UR-MCNC: NEGATIVE — SIGNIFICANT CHANGE UP
BLOOD UR QL VISUAL: HIGH
COLOR SPEC: SIGNIFICANT CHANGE UP
GLUCOSE UR-MCNC: NEGATIVE — SIGNIFICANT CHANGE UP
KETONES UR-MCNC: NEGATIVE — SIGNIFICANT CHANGE UP
LEUKOCYTE ESTERASE UR-ACNC: HIGH
MUCOUS THREADS # UR AUTO: SIGNIFICANT CHANGE UP
NITRITE UR-MCNC: NEGATIVE — SIGNIFICANT CHANGE UP
PH UR: 6.5 — SIGNIFICANT CHANGE UP (ref 4.6–8)
PROT UR-MCNC: NEGATIVE — SIGNIFICANT CHANGE UP
RBC CASTS # UR COMP ASSIST: SIGNIFICANT CHANGE UP (ref 0–?)
SP GR SPEC: 1.02 — SIGNIFICANT CHANGE UP (ref 1–1.03)
SQUAMOUS # UR AUTO: SIGNIFICANT CHANGE UP
UROBILINOGEN FLD QL: NORMAL E.U. — SIGNIFICANT CHANGE UP (ref 0.1–0.2)
WBC UR QL: HIGH (ref 0–?)

## 2017-10-13 PROCEDURE — 74177 CT ABD & PELVIS W/CONTRAST: CPT | Mod: 26

## 2017-10-13 RX ORDER — PANTOPRAZOLE SODIUM 20 MG/1
1 TABLET, DELAYED RELEASE ORAL
Qty: 14 | Refills: 0 | OUTPATIENT
Start: 2017-10-13 | End: 2017-10-27

## 2017-10-13 RX ORDER — ACETAMINOPHEN 500 MG
975 TABLET ORAL ONCE
Qty: 0 | Refills: 0 | Status: COMPLETED | OUTPATIENT
Start: 2017-10-13 | End: 2017-10-13

## 2017-10-13 RX ORDER — ACETAMINOPHEN 500 MG
2 TABLET ORAL
Qty: 84 | Refills: 0 | OUTPATIENT
Start: 2017-10-13 | End: 2017-10-20

## 2017-10-13 RX ORDER — IBUPROFEN 200 MG
1 TABLET ORAL
Qty: 21 | Refills: 0 | OUTPATIENT
Start: 2017-10-13 | End: 2017-10-20

## 2017-10-13 RX ORDER — KETOROLAC TROMETHAMINE 30 MG/ML
15 SYRINGE (ML) INJECTION ONCE
Qty: 0 | Refills: 0 | Status: DISCONTINUED | OUTPATIENT
Start: 2017-10-13 | End: 2017-10-13

## 2017-10-13 RX ADMIN — Medication 15 MILLIGRAM(S): at 04:09

## 2017-10-13 RX ADMIN — Medication 1 TABLET(S): at 04:09

## 2017-10-13 RX ADMIN — Medication 975 MILLIGRAM(S): at 04:09

## 2017-10-13 NOTE — ED PROVIDER NOTE - PROGRESS NOTE DETAILS
Attending Note (Aravind): patient signed out as having lower abd pain, pending CT.  CT noted.  labs noted.  will dc with augmentin and pmd follow up.

## 2017-10-13 NOTE — ED PROVIDER NOTE - PMH
Essential hypertension    History of hypertension    HLD (hyperlipidemia)    Hypercholesterolemia    Hypothyroidism, unspecified type    Influenza

## 2017-10-13 NOTE — ED PROVIDER NOTE - MEDICAL DECISION MAKING DETAILS
77yof w/ lower abdominal pain, urinary frequency, diffusely tender in the lower quadrants. Limited pelvic exam but no evidence of uterine prolapse. Cystitis vs diverticulitis. Will check basic labs, UA, CT A/P 77yof w/ lower abdominal pain, urinary frequency, diffusely tender in the lower quadrants. Limited pelvic exam but no evidence of uterine prolapse. Cystitis vs diverticulitis. Will check basic labs, UA, CT A/P. Chronic cough, worse at night and lying flat, may be secondary to reflux. Will check XR to r/o PNA, and start PPI therapy empirically.

## 2017-10-13 NOTE — ED PROVIDER NOTE - OBJECTIVE STATEMENT
77yof w/ HTN, HLD, hypothyroid p/w 3 weeks of non productive cough, now 2-3 days of lower abdominal pain, and pelvic pressure. Has been having urinary frequency and urge incontinence as well. No chest pain, shortness of breath, dyspnea on exertion, palpitations, nausea, vomiting, diarrhea. Endorses abdominal bloating. Stooling normally, no melena or blood per rectum, passing flatus normally. Feels like something is pushing out her vagina. No known uterine prolapse. Cough is worst at night when lying flat and in the morning. No fevers, chills. 77yof w/ HTN, HLD, hypothyroid p/w 3 weeks of non productive cough, now 2-3 days of lower abdominal pain, and pelvic pressure. Has been having urinary frequency and urge incontinence as well. No chest pain, shortness of breath, dyspnea on exertion, palpitations, nausea, vomiting, diarrhea. Endorses abdominal bloating. Stooling normally, no melena or blood per rectum, passing flatus normally. Feels like something is pushing out her vagina. No known uterine prolapse. Cough is worst at night when lying flat and in the morning. No fevers, chills.    Attending/Jose Miguel: 76 yo F as descriebd above, with  3 weeks of non-productive cough and 2-3 days of lower abd pain.

## 2017-10-13 NOTE — ED PROVIDER NOTE - GENITOURINARY, MLM
Chaperone - Eneida Jeffrey RN. Atrophic external genitalia, no external prolapse however internal examination was limited as pt could not tolerate it.

## 2017-10-13 NOTE — ED PROVIDER NOTE - PHYSICAL EXAMINATION
Attending/Jose Miguel: Non-toxic appearing, NAD, PERRL/EOMI, RRR, CTAB, Abd-soft, +LLQ/RLQ PT, no rebound or guarding

## 2017-10-14 LAB
BACTERIA UR CULT: SIGNIFICANT CHANGE UP
SPECIMEN SOURCE: SIGNIFICANT CHANGE UP

## 2018-08-18 NOTE — ED ADULT TRIAGE NOTE - CHIEF COMPLAINT QUOTE
Pt presents with change in LOC. Pt lethargic, with fever of 104.3F this am taken by family. Pt has been coughing since 4pm yesterday with nausea/vomiting.  Hx HLD, Hypothyroidism, HTN. [id number]
[facility]

## 2018-09-13 NOTE — ED PROVIDER NOTE - NS ED MD DISPO ADMIT [facility] UNIT
7:35 PM 
I have evaluated the patient as the Provider in Triage. I have reviewed His vital signs and the triage nurse assessment. I have talked with the patient and any available family and advised that I am the provider in triage and have ordered the appropriate study to initiate their work up based on the clinical presentation during my assessment. I have advised that the patient will be accommodated in the Main ED as soon as possible. I have also requested to contact the triage nurse or myself immediately if the patient experiences any changes in their condition during this brief waiting period. Urinary retention. Hx same. Notes bladder fullness. Denies fever, N/V. Seen here about one month ago for the same. Urologist: Dr. Javier Urena. + enlarged prostate, prostate CA. TEDDY Dickson 
 
 
 MED

## 2018-09-21 NOTE — ED ADULT NURSE NOTE - CHIEF COMPLAINT QUOTE
Bed: ED02  Expected date: 9/21/18  Expected time: 4:09 PM  Means of arrival: Car  Comments:   pt. c/o a non-productive cough x 3 weeks, HIDALGO and pelvic pain w/ a sensation that "something feels like is going come out" x 1 week.  Pt. reports urinary incontinence and frequency x 4 days and chest pressure.  PMHx HTN, HLD, hypothyroidism.

## 2018-12-11 NOTE — PHYSICAL THERAPY INITIAL EVALUATION ADULT - THERAPY FREQUENCY, PT EVAL
Number Of Freeze-Thaw Cycles: 1 freeze-thaw cycle Duration Of Freeze Thaw-Cycle (Seconds): 5 Consent: The patient's consent was obtained including but not limited to risks of crusting, scabbing, blistering, scarring, darker or lighter pigmentary change, recurrence, incomplete removal and infection. Post-Care Instructions: I reviewed with the patient in detail post-care instructions. Patient is to wear sunprotection, and avoid picking at any of the treated lesions. Pt may apply Vaseline to crusted or scabbing areas. Detail Level: Zone Total Number Of Aks Treated: 1 Render Post-Care Instructions In Note?: yes 3-5x/week

## 2019-04-14 ENCOUNTER — TRANSCRIPTION ENCOUNTER (OUTPATIENT)
Age: 79
End: 2019-04-14

## 2019-05-25 ENCOUNTER — EMERGENCY (EMERGENCY)
Facility: HOSPITAL | Age: 79
LOS: 1 days | Discharge: HOME | End: 2019-05-25
Attending: EMERGENCY MEDICINE | Admitting: EMERGENCY MEDICINE
Payer: MEDICAID

## 2019-05-25 VITALS
TEMPERATURE: 98 F | RESPIRATION RATE: 18 BRPM | HEART RATE: 79 BPM | OXYGEN SATURATION: 98 % | SYSTOLIC BLOOD PRESSURE: 126 MMHG | DIASTOLIC BLOOD PRESSURE: 67 MMHG

## 2019-05-25 VITALS
OXYGEN SATURATION: 100 % | SYSTOLIC BLOOD PRESSURE: 141 MMHG | TEMPERATURE: 98 F | HEART RATE: 107 BPM | DIASTOLIC BLOOD PRESSURE: 77 MMHG | RESPIRATION RATE: 16 BRPM

## 2019-05-25 DIAGNOSIS — H04.559 ACQUIRED STENOSIS OF UNSPECIFIED NASOLACRIMAL DUCT: Chronic | ICD-10-CM

## 2019-05-25 PROBLEM — I10 ESSENTIAL (PRIMARY) HYPERTENSION: Chronic | Status: ACTIVE | Noted: 2017-02-20

## 2019-05-25 PROBLEM — J11.1 INFLUENZA DUE TO UNIDENTIFIED INFLUENZA VIRUS WITH OTHER RESPIRATORY MANIFESTATIONS: Chronic | Status: ACTIVE | Noted: 2017-03-13

## 2019-05-25 PROBLEM — E03.9 HYPOTHYROIDISM, UNSPECIFIED: Chronic | Status: ACTIVE | Noted: 2017-02-20

## 2019-05-25 LAB
APPEARANCE UR: CLEAR — SIGNIFICANT CHANGE UP
BACTERIA # UR AUTO: NEGATIVE — SIGNIFICANT CHANGE UP
BILIRUB UR-MCNC: NEGATIVE — SIGNIFICANT CHANGE UP
BLOOD UR QL VISUAL: SIGNIFICANT CHANGE UP
COLOR SPEC: SIGNIFICANT CHANGE UP
GLUCOSE UR-MCNC: NEGATIVE — SIGNIFICANT CHANGE UP
HYALINE CASTS # UR AUTO: NEGATIVE — SIGNIFICANT CHANGE UP
KETONES UR-MCNC: NEGATIVE — SIGNIFICANT CHANGE UP
LEUKOCYTE ESTERASE UR-ACNC: SIGNIFICANT CHANGE UP
NITRITE UR-MCNC: NEGATIVE — SIGNIFICANT CHANGE UP
PH UR: 6 — SIGNIFICANT CHANGE UP (ref 5–8)
PROT UR-MCNC: 10 — SIGNIFICANT CHANGE UP
RBC CASTS # UR COMP ASSIST: HIGH (ref 0–?)
SP GR SPEC: 1.02 — SIGNIFICANT CHANGE UP (ref 1–1.04)
SQUAMOUS # UR AUTO: SIGNIFICANT CHANGE UP
UROBILINOGEN FLD QL: NORMAL — SIGNIFICANT CHANGE UP
WBC UR QL: HIGH (ref 0–?)

## 2019-05-25 PROCEDURE — 99283 EMERGENCY DEPT VISIT LOW MDM: CPT

## 2019-05-25 PROCEDURE — 72100 X-RAY EXAM L-S SPINE 2/3 VWS: CPT | Mod: 26

## 2019-05-25 RX ORDER — DIAZEPAM 5 MG
1 TABLET ORAL
Qty: 12 | Refills: 0
Start: 2019-05-25 | End: 2019-05-27

## 2019-05-25 RX ORDER — CEPHALEXIN 500 MG
1 CAPSULE ORAL
Qty: 14 | Refills: 0
Start: 2019-05-25 | End: 2019-05-31

## 2019-05-25 RX ORDER — LIDOCAINE 4 G/100G
1 CREAM TOPICAL ONCE
Refills: 0 | Status: COMPLETED | OUTPATIENT
Start: 2019-05-25 | End: 2019-05-25

## 2019-05-25 RX ORDER — IBUPROFEN 200 MG
600 TABLET ORAL ONCE
Refills: 0 | Status: COMPLETED | OUTPATIENT
Start: 2019-05-25 | End: 2019-05-25

## 2019-05-25 RX ORDER — CEPHALEXIN 500 MG
500 CAPSULE ORAL ONCE
Refills: 0 | Status: COMPLETED | OUTPATIENT
Start: 2019-05-25 | End: 2019-05-25

## 2019-05-25 RX ORDER — DIAZEPAM 5 MG
5 TABLET ORAL ONCE
Refills: 0 | Status: DISCONTINUED | OUTPATIENT
Start: 2019-05-25 | End: 2019-05-25

## 2019-05-25 RX ORDER — CEFTRIAXONE 500 MG/1
1 INJECTION, POWDER, FOR SOLUTION INTRAMUSCULAR; INTRAVENOUS ONCE
Refills: 0 | Status: DISCONTINUED | OUTPATIENT
Start: 2019-05-25 | End: 2019-05-25

## 2019-05-25 RX ADMIN — Medication 5 MILLIGRAM(S): at 12:16

## 2019-05-25 RX ADMIN — Medication 500 MILLIGRAM(S): at 16:55

## 2019-05-25 RX ADMIN — LIDOCAINE 1 PATCH: 4 CREAM TOPICAL at 12:16

## 2019-05-25 RX ADMIN — Medication 50 MILLIGRAM(S): at 16:55

## 2019-05-25 RX ADMIN — Medication 600 MILLIGRAM(S): at 12:16

## 2019-05-25 NOTE — ED ADULT NURSE NOTE - CHIEF COMPLAINT QUOTE
c/o nontraumatic left lower back pain radiating into left hip and left thigh x 1 week. Denies any urinary symptoms. Seen by PCP and placed on Prednisone x 3 days and Flexeril. Granddaughter states pain is worst today.

## 2019-05-25 NOTE — ED PROVIDER NOTE - OBJECTIVE STATEMENT
79f w hx HTN, HLD, hypothyroidism here c/o 1 wk left sacral pain. Pt Tajik-speaking, was offered  but prefers to use granddaughter at bedside to translate. Describes 1 wk of pinching-type pain in left sacral area radiating down to thigh. Pain is worst w movement and walking though has been able to ambulate. Never had similar before. Did a lot of garden work recently. Denies dysuria, hematuria or difficulty urinating. No stone hx. No abd pain, no n/v/d. Went to urgent care 4 days ago where was examined and given prednisone and flexeril, which has been taking w/o relief. No bowel or bladder dysfunction, no sensory or motor loss in legs, no recent back surgeries or injections, no history of intravenous drug abuse, no fevers, chills or night sweats.

## 2019-05-25 NOTE — ED PROVIDER NOTE - ATTENDING CONTRIBUTION TO CARE
Kely: 80 yo female with a h/o HTN, HLD, and hypothyroidism c/o one week of left sided lower back pain radiating to her buttock and done her left thigh. No h/o back pain previously. No direct trauma to the area but the pain began after she spent all day gardening. + associated lateral left thigh paresthesias but no LE weakness. No urinary retention or fecal incontinence. No saddle anesthesia. No dysuria or hematuria. No associated abdominal pain, nausea or vomiting. No fevers or chills. No chest pain or SOB. Pt [place] [facility] and started on 20mg prednisone which she took for 3 days but did not help. Pt has also been taking flexeril TID without relief. No NSAID use in the last 5 days. Exam: GENERAL: well appearing, NAD, HEENT: MMM, CARDIO: +S1/S2, no murmurs, rubs or gallops, LUNGS: CTA B?/, no wheezing, rales or rhonchi, ABD: soft, nontender, BSx4 quadrants, no guarding or rigidity. MSK: No CVA TTP, No reproducible midline spinal TTP, + left sided paraspinal lumbosacral and buttock TTP. + piriformis spasm. EXT: 5/5 strength x 4 extremities NEURO: AxOx3, no saddle anesthesia, S1 intact b/l, ambulatory, + left lateral thigh subjective sensory deficits.  SKIN: no rashes or lesions, well perfused A/P- 80yo female with musculoskeletal back pain, likely sciatica. pain is reproducible with palpation of piriformis. isolated lateral thigh paresthesias but given age will obtain u/a, and xrays to ensure no pathologic fracture or UTI. will treat symptomatically and reassess. Kely: 80 yo female with a h/o HTN, HLD, and hypothyroidism c/o one week of left sided lower back pain radiating to her buttock and done her left thigh. No h/o back pain previously. No direct trauma to the area but the pain began after she spent all day gardening. + associated lateral left thigh paresthesias but no LE weakness. No urinary retention or fecal incontinence. No saddle anesthesia. No dysuria or hematuria. No associated abdominal pain, nausea or vomiting. No fevers or chills. No chest pain or SOB. Pt seen [place] [facility] [place] and started on 20mg prednisone which she took for 3 days but did not help. Pt has also been taking flexeril TID without relief. No NSAID use in the last 5 days. Exam: GENERAL: well appearing, NAD, HEENT: MMM, CARDIO: +S1/S2, no murmurs, rubs or gallops, LUNGS: CTA B?/, no wheezing, rales or rhonchi, ABD: soft, nontender, BSx4 quadrants, no guarding or rigidity. MSK: No CVA TTP, No reproducible midline spinal TTP, + left sided paraspinal lumbosacral and buttock TTP. + piriformis spasm. EXT: 5/5 strength x 4 extremities NEURO: AxOx3, no saddle anesthesia, S1 intact b/l, ambulatory, + left lateral thigh subjective sensory deficits.  SKIN: no rashes or lesions, well perfused A/P- 80yo female with musculoskeletal back pain, likely sciatica. pain is reproducible with palpation of piriformis. isolated lateral thigh paresthesias but given age will obtain u/a, and xrays to ensure no pathologic fracture or UTI. will treat symptomatically and reassess.  Vietnamese translation provided by granddaughter/health care doctor at her request. Pt declined translation services.

## 2019-05-25 NOTE — ED ADULT NURSE NOTE - OBJECTIVE STATEMENT
pt laying in stretcher with family at bedside reports about 1 week of left lower back pain radiating down left leg with some numbness/tingling to left thigh. denies injury, + pulses, no deformity.  Medicated as ordered, pending xray, will continue to monitor

## 2019-05-25 NOTE — ED PROVIDER NOTE - NSFOLLOWUPINSTRUCTIONS_ED_ALL_ED_FT
You were seen in the emergency department for back pain. Please follow up with your primary doctor in the next 3-5 days. Take motrin 400 milligrams every 4 hours as needed for continued pain. If pain is severe take valium 5 milligrams every 6 hours. Also take cephalexin (an antibiotic) twice a day for the next 1 week. Return to the emergency department immediately if you experience fever, weakness or numbness in your legs, inability to urinate or have a bowel movement, or any other concerning symptoms.

## 2019-05-25 NOTE — ED PROVIDER NOTE - PROGRESS NOTE DETAILS
Elizabeth Goldberger PGY-2: pt still c/o persistent pain, will try prednisone 50 (dose was given was 20). Ua w mod leuk est and small wbc, no bact or nitrites. Will treat given age and sx

## 2019-05-25 NOTE — ED ADULT TRIAGE NOTE - CHIEF COMPLAINT QUOTE
c/o nontraumatic left lower back pain radiating into left hip and left thigh x 1 week. Seen by PCP and placed on Prednisone x 3 days and Flexeril. Granddaughter states pain is worst today. c/o nontraumatic left lower back pain radiating into left hip and left thigh x 1 week. Denies any urinary symptoms. Seen by PCP and placed on Prednisone x 3 days and Flexeril. Granddaughter states pain is worst today.

## 2019-05-25 NOTE — ED PROVIDER NOTE - CLINICAL SUMMARY MEDICAL DECISION MAKING FREE TEXT BOX
79f w hx HTN, HLD, hypothyroidism here c/o 1 wk left sacral pain radiating down to thigh. Exam s/f well-appearing female in mild distress. No abd or CVAT. Tender in left lower sacral area, w straight leg inducing pain ~70 deg. Likely sciatica given hx and exam. Pyelo less likely given hx, but as pt has not had previous workup will check ua to r/o, also xray lumbosacral spine. Also pain ctrl, likely dc

## 2019-05-27 LAB
BACTERIA UR CULT: SIGNIFICANT CHANGE UP
SPECIMEN SOURCE: SIGNIFICANT CHANGE UP

## 2019-05-28 ENCOUNTER — TRANSCRIPTION ENCOUNTER (OUTPATIENT)
Age: 79
End: 2019-05-28

## 2019-09-18 NOTE — H&P ADULT. - LYMPHATIC
No
supraclavicular R/posterior cervical L/posterior cervical R/supraclavicular L/anterior cervical R

## 2019-12-03 ENCOUNTER — TRANSCRIPTION ENCOUNTER (OUTPATIENT)
Age: 79
End: 2019-12-03

## 2020-03-23 NOTE — ED ADULT TRIAGE NOTE - PAIN RATING/NUMBER SCALE (0-10): ACTIVITY
[facility] Medicine Services Daily Progress Note      Hospitalist Team  LOS 0 days      Patient Care Team:  Mervin Pathak MD as PCP - General  Mervin Pathak MD as PCP - Family Medicine    Patient Location: 373/1      Subjective   Subjective     Chief Complaint / Subjective  Chief Complaint   Patient presents with   • Abdominal Pain         Brief Synopsis of Hospital Course/HPI  Mr. Oliva is a 57 y.o.  male with a history of tobacco use, methamphetamine use presented to the [facility] ED with abdominal pain, nausea and vomiting x2 to 3 days.  Patient describes pain as sharp, radiates into his mid abdomen, worse after he eats.  And rates it a 6 out of 10.  Patient states he noticed that is gotten worse over the last 2 days.  Patient has recently lost his wife to lung cancer (2 to 3 days ago).  Patient states he drinks alcohol occasionally last drink was yesterday.  Patient denies daily drinking patient denies fevers, chills, cough, congestion, and shortness of breath.     In the  ED, lipase 1449, WBC 9.6, Hgb 13.7, HCT 39.5, platelets 294, glucose 195, sodium 135, potassium 4.1, BUN 10, creatinine 0.73.  Liver enzymes within normal limits.  Urine drug screen positive for methamphetamines.  UA: 1+ glucose, CT abdomen: Chronic pancreatitis.  A 0.6 cm calcification in the pancreatic head is suspicious for intraductal stone.  There is an upstream irregular pancreatic ductal dilatation which is new.  She will be admitted for further evaluation and management patient received 500 mL normal saline bolus, 1 mg Dilaudid IV, Zofran 4 mg IV.     Patient was evaluated utilizing  appropriate PPE      Date: 3/23- abdominal pain, anxious to go home        Review of Systems   Constitution: Negative.   HENT: Negative.    Eyes: Negative.    Cardiovascular: Negative.    Respiratory: Negative.    Endocrine: Negative.    Hematologic/Lymphatic: Negative.    Skin: Negative.   "  Musculoskeletal: Negative.    Gastrointestinal: Positive for abdominal pain and anorexia.   Genitourinary: Negative.    Neurological: Negative.    Psychiatric/Behavioral: Negative.    Allergic/Immunologic: Negative.    All other systems reviewed and are negative.        Objective   Objective      Vital Signs  Temp:  [97.5 °F (36.4 °C)-98.2 °F (36.8 °C)] 97.8 °F (36.6 °C)  Heart Rate:  [74-91] 75  Resp:  [16-18] 17  BP: (128-161)/(80-97) 144/85  Oxygen Therapy  SpO2: 100 %  Device (Oxygen Therapy): room air  Flowsheet Rows      First Filed Value   Admission Height  170.2 cm (67\") Documented at 03/22/2020 1741   Admission Weight  68.5 kg (151 lb 0.2 oz) Documented at 03/22/2020 1741        Intake & Output (last 3 days)       03/20 0701 - 03/21 0700 03/21 0701 - 03/22 0700 03/22 0701 - 03/23 0700 03/23 0701 - 03/24 0700    P.O.    0    I.V. (mL/kg)   890 (13.1)     IV Piggyback   500     Total Intake(mL/kg)   1390 (20.4) 0 (0)    Urine (mL/kg/hr)   0     Stool   0     Total Output   0     Net   +1390 0            Urine Unmeasured Occurrence   3 x         Lines, Drains & Airways    Active LDAs     Name:   Placement date:   Placement time:   Site:   Days:    Peripheral IV 03/22/20 1752 Right Antecubital   03/22/20 1752    Antecubital   less than 1                Physical Exam   Constitutional: He is oriented to person, place, and time. He appears well-developed and well-nourished. No distress.   HENT:   Head: Normocephalic and atraumatic.   Mouth/Throat: No oropharyngeal exudate.   Eyes: Pupils are equal, round, and reactive to light. Conjunctivae and EOM are normal. Right eye exhibits no discharge. Left eye exhibits no discharge. No scleral icterus.   Neck: Normal range of motion. No JVD present. No tracheal deviation present. No thyromegaly present.   Cardiovascular: Normal rate, regular rhythm, normal heart sounds and intact distal pulses. Exam reveals no gallop and no friction rub.   No murmur " heard.  Pulmonary/Chest: Effort normal and breath sounds normal. No stridor. No respiratory distress. He has no wheezes. He has no rales. He exhibits no tenderness.   Abdominal: Soft. Bowel sounds are normal. He exhibits no distension and no mass. There is tenderness. There is no rebound and no guarding. No hernia.   Musculoskeletal: Normal range of motion. He exhibits no edema, tenderness or deformity.   Lymphadenopathy:     He has no cervical adenopathy.   Neurological: He is alert and oriented to person, place, and time. No cranial nerve deficit or sensory deficit. He exhibits normal muscle tone. Coordination normal.   Skin: Skin is warm and dry. No rash noted. He is not diaphoretic. No erythema.   Psychiatric: He has a normal mood and affect. His behavior is normal.   Nursing note and vitals reviewed.        Procedures:    Results Review:     I reviewed the patient's new clinical results.      Lab Results (last 24 hours)     Procedure Component Value Units Date/Time    Lipase [[id number]]  (Abnormal) Collected:  03/23/20 0454    Specimen:  Blood Updated:  03/23/20 0553     Lipase 655 U/L     Comprehensive Metabolic Panel [[id number]]  (Abnormal) Collected:  03/23/20 0454    Specimen:  Blood Updated:  03/23/20 0546     Glucose 124 mg/dL      BUN 8 mg/dL      Creatinine 0.58 mg/dL      Sodium 133 mmol/L      Potassium 4.4 mmol/L      Chloride 99 mmol/L      CO2 24.0 mmol/L      Calcium 9.0 mg/dL      Total Protein 7.0 g/dL      Albumin 3.80 g/dL      ALT (SGPT) 18 U/L      AST (SGOT) 22 U/L      Alkaline Phosphatase 76 U/L      Total Bilirubin 0.4 mg/dL      eGFR Non African Amer 145 mL/min/1.73      Globulin 3.2 gm/dL      A/G Ratio 1.2 g/dL      BUN/Creatinine Ratio 13.8     Anion Gap 10.0 mmol/L     Narrative:       GFR Normal >60  Chronic Kidney Disease <60  Kidney Failure <15      Lipid Panel [[id number]] Collected:  03/23/20 0454    Specimen:  Blood Updated:  03/23/20 0546     Total Cholesterol 122 mg/dL       Triglycerides 120 mg/dL      HDL Cholesterol 43 mg/dL      LDL Cholesterol  55 mg/dL      VLDL Cholesterol 24 mg/dL      LDL/HDL Ratio 1.28    Narrative:       Cholesterol Reference Ranges  (U.S. Department of Health and Human Services ATP III Classifications)    Desirable          <200 mg/dL  Borderline High    200-239 mg/dL  High Risk          >240 mg/dL      Triglyceride Reference Ranges  (U.S. Department of Health and Human Services ATP III Classifications)    Normal           <150 mg/dL  Borderline High  150-199 mg/dL  High             200-499 mg/dL  Very High        >500 mg/dL    HDL Reference Ranges  (U.S. Department of Health and Human Services ATP III Classifcations)    Low     <40 mg/dl (major risk factor for CHD)  High    >60 mg/dl ('negative' risk factor for CHD)        LDL Reference Ranges  (U.S. Department of Health and Human Services ATP III Classifcations)    Optimal          <100 mg/dL  Near Optimal     100-129 mg/dL  Borderline High  130-159 mg/dL  High             160-189 mg/dL  Very High        >189 mg/dL    CBC Auto Differential [[id number]]  (Abnormal) Collected:  03/23/20 0454    Specimen:  Blood Updated:  03/23/20 0523     WBC 6.60 10*3/mm3      RBC 4.14 10*6/mm3      Hemoglobin 12.9 g/dL      Hematocrit 37.7 %      MCV 91.2 fL      MCH 31.2 pg      MCHC 34.3 g/dL      RDW 14.1 %      RDW-SD 45.1 fl      MPV 7.4 fL      Platelets 242 10*3/mm3      Neutrophil % 65.1 %      Lymphocyte % 24.2 %      Monocyte % 9.0 %      Eosinophil % 0.5 %      Basophil % 1.2 %      Neutrophils, Absolute 4.30 10*3/mm3      Lymphocytes, Absolute 1.60 10*3/mm3      Monocytes, Absolute 0.60 10*3/mm3      Eosinophils, Absolute 0.00 10*3/mm3      Basophils, Absolute 0.10 10*3/mm3      nRBC 0.0 /100 WBC     Hemoglobin A1c [[id number]] Collected:  03/23/20 0454    Specimen:  Blood Updated:  03/23/20 0516    Urine Drug Screen - [[id number]]  (Abnormal) Collected:  03/22/20 0810    Specimen:  Urine Updated:  03/22/20 2235      Amphet/Methamphet, Screen Positive     Barbiturates Screen, Urine Negative     Benzodiazepine Screen, Urine Negative     Cocaine Screen, Urine Negative     Opiate Screen Negative     THC, Screen, Urine Negative     Methadone Screen, Urine Negative     Oxycodone Screen, Urine Negative    Narrative:       Negative Thresholds For Drugs Screened:     Amphetamines               500 ng/ml   Barbiturates               200 ng/ml   Benzodiazepines            100 ng/ml   Cocaine                    300 ng/ml   Methadone                  300 ng/ml   Opiates                    300 ng/ml   Oxycodone                  100 ng/ml   THC                        50 ng/ml    The Normal Value for all drugs tested is negative. This report includes final unconfirmed screening results to be used for medical treatment purposes only. Unconfirmed results must not be used for non-medical purposes such as employment or legal testing. Clinical consideration should be applied to any drug of abuse test, particulary when unconfirmed results are used.  All urine drugs of abuse requests without chain of custody are for medical screening purposes only.  False positives are possible.      Extra Tubes [[id number]] Collected:  03/22/20 1825    Specimen:  Blood, Venous Line Updated:  03/22/20 1930    Narrative:       The following orders were created for panel order Extra Tubes.  Procedure                               Abnormality         Status                     ---------                               -----------         ------                     Green Top (Gel)[[id number]]                                  Final result                 Please view results for these tests on the individual orders.    Green Top (Gel) [[id number]] Collected:  03/22/20 1825    Specimen:  Blood Updated:  03/22/20 1930     Extra Tube Hold for add-ons.     Comment: Auto resulted.       Lipase [[id number]]  (Abnormal) Collected:  03/22/20 1752    Specimen:  Blood Updated:   03/22/20 1832     Lipase 1,449 U/L     Comprehensive Metabolic Panel [[id number]]  (Abnormal) Collected:  03/22/20 1752    Specimen:  Blood Updated:  03/22/20 1823     Glucose 195 mg/dL      BUN 10 mg/dL      Creatinine 0.73 mg/dL      Sodium 135 mmol/L      Potassium 4.1 mmol/L      Chloride 96 mmol/L      CO2 27.0 mmol/L      Calcium 10.1 mg/dL      Total Protein 7.7 g/dL      Albumin 4.30 g/dL      ALT (SGPT) 20 U/L      AST (SGOT) 24 U/L      Alkaline Phosphatase 84 U/L      Total Bilirubin 0.4 mg/dL      eGFR Non African Amer 111 mL/min/1.73      Globulin 3.4 gm/dL      A/G Ratio 1.3 g/dL      BUN/Creatinine Ratio 13.7     Anion Gap 12.0 mmol/L     Narrative:       GFR Normal >60  Chronic Kidney Disease <60  Kidney Failure <15      Urinalysis With Microscopic If Indicated (No Culture) - Urine, Random Void [[id number]]  (Abnormal) Collected:  03/22/20 1810    Specimen:  Urine, Random Void Updated:  03/22/20 1818     Color, UA Yellow     Appearance, UA Clear     pH, UA 6.0     Specific Gravity, UA 1.025     Glucose,  mg/dL (1+)     Ketones, UA Negative     Bilirubin, UA Negative     Blood, UA Negative     Protein, UA Negative     Leuk Esterase, UA Negative     Nitrite, UA Negative     Urobilinogen, UA 0.2 E.U./dL    Narrative:       Urine microscopic not indicated.    CBC & Differential [[id number]] Collected:  03/22/20 1752    Specimen:  Blood Updated:  03/22/20 1755    Narrative:       The following orders were created for panel order CBC & Differential.  Procedure                               Abnormality         Status                     ---------                               -----------         ------                     CBC Auto Differential[[id number]]        Abnormal            Final result                 Please view results for these tests on the individual orders.    CBC Auto Differential [[id number]]  (Abnormal) Collected:  03/22/20 1752    Specimen:  Blood Updated:  03/22/20 1755     WBC 9.60  10*3/mm3      RBC 4.37 10*6/mm3      Hemoglobin 13.7 g/dL      Hematocrit 39.5 %      MCV 90.4 fL      MCH 31.2 pg      MCHC 34.5 g/dL      RDW 14.3 %      RDW-SD 45.5 fl      MPV 7.5 fL      Platelets 294 10*3/mm3      Neutrophil % 78.4 %      Lymphocyte % 15.4 %      Monocyte % 5.3 %      Eosinophil % 0.2 %      Basophil % 0.7 %      Neutrophils, Absolute 7.50 10*3/mm3      Lymphocytes, Absolute 1.50 10*3/mm3      Monocytes, Absolute 0.50 10*3/mm3      Eosinophils, Absolute 0.00 10*3/mm3      Basophils, Absolute 0.10 10*3/mm3      nRBC 0.1 /100 WBC         No results found for: HGBA1C            Lab Results   Component Value Date    LIPASE 655 (H) 03/23/2020     Lab Results   Component Value Date    CHOL 122 03/23/2020    TRIG 120 03/23/2020    HDL 43 03/23/2020    LDL 55 03/23/2020       No results found for: INTRAOP, PREDX, FINALDX, COMDX    Microbiology Results (last 10 days)     ** No results found for the last 240 hours. **          ECG/EMG Results (most recent)     None                    Ct Abdomen Pelvis With Contrast    Result Date: 3/22/2020  1. Patchy groundglass airspace disease in the right middle lobe. This is nonspecific. In the acute setting, this likely represents an infectious or inflammatory process. 2. Chronic pancreatitis. A 0.6 cm calcification in the pancreatic head is suspicious for an intraductal stone. There is upstream irregular pancreatic ductal dilatation which is new relative to the prior study. GI consultation is recommended for further management. 3. Atherosclerosis, without abdominal aortic aneurysm. 4. Emphysema. Electronically signed by:  Jeremy Basilio M.D.  3/22/2020 6:26 PM          Xrays, labs reviewed personally by physician.    Medication Review:   I have reviewed the patient's current medication list      Scheduled Meds    sodium chloride 10 mL Intravenous Q12H       Meds Infusions    sodium chloride 125 mL/hr Last Rate: 125 mL/hr (03/23/20 0821)       Meds PRN  •   "acetaminophen **OR** acetaminophen **OR** acetaminophen  •  aluminum-magnesium hydroxide-simethicone  •  bisacodyl  •  HYDROmorphone  •  influenza vaccine  •  magnesium hydroxide  •  melatonin  •  ondansetron **OR** ondansetron  •  [COMPLETED] Insert peripheral IV **AND** sodium chloride  •  sodium chloride    I personally reviewed patient's x-ray films    I personally reviewed patient's EKG strips   Assessment/Plan   Assessment/Plan     Active Hospital Problems    Diagnosis  POA   • **Pancreatitis [K85.90]  Yes   • Tobacco abuse [Z72.0]  Yes   • Hyperglycemia [R73.9]  Yes      Resolved Hospital Problems   No resolved problems to display.       MEDICAL DECISION MAKING COMPLEXITY BY PROBLEM:     Pancreatitis  -Lipase [id number]  -IV fluids normal saline 125 mL/h  -Keep patient n.p.o.  -GI consulting>\"Plan MRCP.  If MRCP confirms pancreatic duct stone and his symptoms improve he may benefit from referral to [facility] for definitive ERCP therapy.     Hyperglycemia  -Check A1c  -Recheck BMP in a.m.     Tobacco abuse  -Encourage  cessation     Methamphetamine abuse  -Encourage lifestyle modifications  VTE Prophylaxis -   Mechanical Order History:      Ordered        03/22/20 2055  Place Sequential Compression Device  Once         03/22/20 2055  Maintain Sequential Compression Device  Continuous                 Pharmalogical Order History:     None        Code Status -   Code Status and Medical Interventions:   Ordered at: 03/22/20 2055     Level Of Support Discussed With:    Patient     Code Status:    CPR     Medical Interventions (Level of Support Prior to Arrest):    Full       Full PPE worn during encounter: yes    Discharge Planning    Home  When stable      Destination      Coordination has not been started for this encounter.      Durable Medical Equipment      Coordination has not been started for this encounter.      Dialysis/Infusion      Coordination has not been started for this encounter.      Home " Medical Care      Coordination has not been started for this encounter.      Therapy      Coordination has not been started for this encounter.      Community Resources      Coordination has not been started for this encounter.        Electronically signed by Deyvi Sotelo MD, 03/23/20, 09:53.  [facility] Hospitalist Team       10

## 2020-04-29 NOTE — PATIENT PROFILE ADULT. - NS PRO CONTRA REFUSE FLU INFO
negative...
Yes-Patient/Caregiver accepts free interpretation services...
Risks/benefits discussed with patient or patient surrogate

## 2020-08-07 NOTE — ED ADULT NURSE REASSESSMENT NOTE - COMFORT CARE
I will continue the patient's present medical regimen  The patient appears well compensated  I have asked the patient to call if there is a problem in the interim otherwise I will see the patient in six months time 
side rails up

## 2020-10-27 NOTE — PATIENT PROFILE ADULT. - PRESSURE ULCER(S)
Arterial Line  Performed by: Denys Hinson MD  Authorized by: Denys Hinson MD     Patient Location:  OR  Indication*:  Continuous blood pressure monitoring  Laterality*:  Left  Site*:  Radial  Max Sterile Barrier Technique*:  Sterile gloves, Sterile dressing applied and Cap/Mask  Local Anesthetic:  None  Prep*:  Chlorhexidine gluconate (CHG)  Catheter Size*:  20 G  Catheter Length*:  1 3/8 in  Catheter Type:  Arrow  Ultrasound-Guided*: No    Seldinger Technique: Yes    Line Secured*:  Tape and Transparent dressing  Events: patient tolerated procedure well with no complications    Performed By:  Anesthesiologist  Anesthesiologist:  Denys Hinson MD         no

## 2020-12-09 NOTE — DISCHARGE NOTE ADULT - CARE PLAN
Negative
Principal Discharge DX:	Influenza A with respiratory manifestations  Goal:	Clearance of Viral Infection  Instructions for follow-up, activity and diet:	Please continue to take the medication called TAMIFLU, sent to your pharmacy for 4 more days.  You will take one medication two times a day.  A total of 8 pills will be filled for you at your pharmacy.  If you received a morning dose today, you will be due for a night time dose tonight (the day of discharge).    Continue to use nyquil, and tylenol for symptomatic relief.  DO NOT take more than 4,000 mg of Tylenol in one day.  Secondary Diagnosis:	Essential hypertension  Goal:	Goal BP to be <130/90  Instructions for follow-up, activity and diet:	Continue to take your blood pressure medications at home as prescribed by your doctor.  Secondary Diagnosis:	Hyperlipidemia, unspecified hyperlipidemia type  Goal:	Controlling Lipids  Instructions for follow-up, activity and diet:	Continue your simvastatin as prescribed by your doctor.  Secondary Diagnosis:	Hypothyroidism, unspecified type

## 2022-05-19 ENCOUNTER — NON-APPOINTMENT (OUTPATIENT)
Age: 82
End: 2022-05-19

## 2022-07-24 ENCOUNTER — NON-APPOINTMENT (OUTPATIENT)
Age: 82
End: 2022-07-24

## 2022-10-03 NOTE — PROVIDER CONTACT NOTE (OTHER) - SITUATION
Patient: Nhi Napier    Procedure Summary     Date: 10/02/22 Room / Location: [facility] OR 09 / SURGERY [facility]    Anesthesia Start: 0423 Anesthesia Stop: 0453    Procedure: DILATION AND EVACUATION, UTERUS (Uterus) Diagnosis: (Retained Products of Conception)    Surgeons: Tamiko Metcalf M.D. Responsible Provider: Silas Lopez M.D.    Anesthesia Type: general ASA Status: 1          Final Anesthesia Type: general  Last vitals  BP   Blood Pressure: 113/60    Temp   36.8 °C (98.3 °F)    Pulse   89   Resp   17    SpO2   99 %      Anesthesia Post Evaluation    Patient location during evaluation: PACU  Patient participation: complete - patient participated  Level of consciousness: awake and alert    Airway patency: patent  Anesthetic complications: no  Cardiovascular status: hemodynamically stable  Respiratory status: acceptable  Hydration status: euvolemic    PONV: none          No notable events documented.     Nurse Pain Score: 0 (NPRS)         temp  of 104.7

## 2022-12-21 NOTE — ED PROVIDER NOTE - CROS ED GU ALL NEG
negative... Odomzo Pregnancy And Lactation Text: This medication is Pregnancy Category X and is absolutely contraindicated during pregnancy. It is unknown if it is excreted in breast milk.

## 2023-02-19 ENCOUNTER — NON-APPOINTMENT (OUTPATIENT)
Age: 83
End: 2023-02-19

## 2023-07-18 NOTE — ED PROVIDER NOTE - NSTIMEPROVIDERCAREINITIATE_GEN_ER
Goal Outcome Evaluation:  Plan of Care Reviewed With: patient        Progress: no change  Outcome Evaluation: Slept at intervals. IV fluids infusing. c/o right flank pain, PRN tylenol given. No c/o chest pain. Will continue to monitor.          12-Oct-2017 22:11

## 2023-10-30 NOTE — ED PROVIDER NOTE - DATE/TIME 1
From: Ly Stone  To: Eliane Correa  Sent: 10/29/2023 8:38 PM CDT  Subject: Follow up on Rx and monitor results     I am following up on my prescription, which required a pre-authorization request. I've heard nothing further since October 10th. The pharmacy no longer lists the prescription in my records.    I'm also checking the status of the Zio monitor, which I mailed back on October 11th.   
12-Mar-2017 20:49

## 2024-03-09 NOTE — H&P ADULT. - RS GEN PE MLT RESP DETAILS PC
yes breath sounds equal/respirations non-labored/normal/no wheezes/clear to auscultation bilaterally/airway patent/no chest wall tenderness/good air movement/no rhonchi/no rales

## 2024-03-19 NOTE — ED PROVIDER NOTE - NS ED ATTENDING STATEMENT MOD
lisinopril-hydroCHLOROthiazide (ZESTORETIC) 20-12.5 MG per tablet 180 tablet 1 3/18/2024 --    Sig - Route: Take 2 tablets by mouth daily. - Oral    Sent to pharmacy as: Lisinopril-hydroCHLOROthiazide 20-12.5 MG Oral Tablet (ZESTORETIC)       I have personally seen and examined this patient.  I have fully participated in the care of this patient. I have reviewed all pertinent clinical information, including history, physical exam, plan and the Resident’s note and agree except as noted.

## 2024-08-01 NOTE — ED ADULT TRIAGE NOTE - INTERPRETATION SERVICES DECLINED
IV access removed.  Tele removed.  Meds from pharmacy given to patient.  Verbalized understanding of discharge instructions   May/Patient/Caregiver requests family/friend to interpret.

## 2025-01-22 ENCOUNTER — APPOINTMENT (OUTPATIENT)
Dept: SPINE | Facility: CLINIC | Age: 85
End: 2025-01-22

## 2025-04-02 ENCOUNTER — APPOINTMENT (OUTPATIENT)
Dept: ORTHOPEDIC SURGERY | Facility: CLINIC | Age: 85
End: 2025-04-02
Payer: MEDICAID

## 2025-04-02 VITALS
DIASTOLIC BLOOD PRESSURE: 97 MMHG | BODY MASS INDEX: 26.11 KG/M2 | OXYGEN SATURATION: 97 % | HEART RATE: 98 BPM | SYSTOLIC BLOOD PRESSURE: 148 MMHG | HEIGHT: 60 IN | WEIGHT: 133 LBS

## 2025-04-02 DIAGNOSIS — M19.011 PRIMARY OSTEOARTHRITIS, RIGHT SHOULDER: ICD-10-CM

## 2025-04-02 DIAGNOSIS — M67.911 UNSPECIFIED DISORDER OF SYNOVIUM AND TENDON, RIGHT SHOULDER: ICD-10-CM

## 2025-04-02 DIAGNOSIS — M67.912 UNSPECIFIED DISORDER OF SYNOVIUM AND TENDON, LEFT SHOULDER: ICD-10-CM

## 2025-04-02 PROCEDURE — 20611 DRAIN/INJ JOINT/BURSA W/US: CPT | Mod: 50

## 2025-04-02 PROCEDURE — 99204 OFFICE O/P NEW MOD 45 MIN: CPT | Mod: 25

## 2025-04-02 PROCEDURE — 73030 X-RAY EXAM OF SHOULDER: CPT | Mod: LT

## 2025-07-02 ENCOUNTER — APPOINTMENT (OUTPATIENT)
Age: 85
End: 2025-07-02